# Patient Record
Sex: FEMALE | Race: WHITE | HISPANIC OR LATINO | URBAN - METROPOLITAN AREA
[De-identification: names, ages, dates, MRNs, and addresses within clinical notes are randomized per-mention and may not be internally consistent; named-entity substitution may affect disease eponyms.]

---

## 2018-09-12 ENCOUNTER — OUTPATIENT (OUTPATIENT)
Dept: OUTPATIENT SERVICES | Facility: HOSPITAL | Age: 23
LOS: 1 days | Discharge: HOME | End: 2018-09-12

## 2018-09-12 DIAGNOSIS — I10 ESSENTIAL (PRIMARY) HYPERTENSION: ICD-10-CM

## 2018-09-12 DIAGNOSIS — E55.9 VITAMIN D DEFICIENCY, UNSPECIFIED: ICD-10-CM

## 2018-09-12 DIAGNOSIS — Z13.220 ENCOUNTER FOR SCREENING FOR LIPOID DISORDERS: ICD-10-CM

## 2018-09-12 DIAGNOSIS — D64.9 ANEMIA, UNSPECIFIED: ICD-10-CM

## 2018-09-12 DIAGNOSIS — N39.0 URINARY TRACT INFECTION, SITE NOT SPECIFIED: ICD-10-CM

## 2019-07-26 ENCOUNTER — EMERGENCY (EMERGENCY)
Facility: HOSPITAL | Age: 24
LOS: 0 days | Discharge: HOME | End: 2019-07-27
Attending: EMERGENCY MEDICINE | Admitting: EMERGENCY MEDICINE
Payer: MEDICAID

## 2019-07-26 VITALS
TEMPERATURE: 98 F | DIASTOLIC BLOOD PRESSURE: 61 MMHG | OXYGEN SATURATION: 98 % | SYSTOLIC BLOOD PRESSURE: 121 MMHG | RESPIRATION RATE: 20 BRPM | HEART RATE: 91 BPM

## 2019-07-26 DIAGNOSIS — R10.31 RIGHT LOWER QUADRANT PAIN: ICD-10-CM

## 2019-07-26 DIAGNOSIS — N73.0 ACUTE PARAMETRITIS AND PELVIC CELLULITIS: ICD-10-CM

## 2019-07-26 DIAGNOSIS — R10.9 UNSPECIFIED ABDOMINAL PAIN: ICD-10-CM

## 2019-07-26 DIAGNOSIS — R11.0 NAUSEA: ICD-10-CM

## 2019-07-26 DIAGNOSIS — N89.9 NONINFLAMMATORY DISORDER OF VAGINA, UNSPECIFIED: ICD-10-CM

## 2019-07-26 LAB
ALBUMIN SERPL ELPH-MCNC: 4.4 G/DL — SIGNIFICANT CHANGE UP (ref 3.5–5.2)
ALP SERPL-CCNC: 92 U/L — SIGNIFICANT CHANGE UP (ref 30–115)
ALT FLD-CCNC: 15 U/L — SIGNIFICANT CHANGE UP (ref 0–41)
ANION GAP SERPL CALC-SCNC: 13 MMOL/L — SIGNIFICANT CHANGE UP (ref 7–14)
APPEARANCE UR: CLEAR — SIGNIFICANT CHANGE UP
AST SERPL-CCNC: 19 U/L — SIGNIFICANT CHANGE UP (ref 0–41)
BASOPHILS # BLD AUTO: 0.04 K/UL — SIGNIFICANT CHANGE UP (ref 0–0.2)
BASOPHILS NFR BLD AUTO: 0.3 % — SIGNIFICANT CHANGE UP (ref 0–1)
BILIRUB DIRECT SERPL-MCNC: <0.2 MG/DL — SIGNIFICANT CHANGE UP (ref 0–0.2)
BILIRUB INDIRECT FLD-MCNC: >0 MG/DL — LOW (ref 0.2–1.2)
BILIRUB SERPL-MCNC: 0.2 MG/DL — SIGNIFICANT CHANGE UP (ref 0.2–1.2)
BILIRUB UR-MCNC: NEGATIVE — SIGNIFICANT CHANGE UP
BLD GP AB SCN SERPL QL: SIGNIFICANT CHANGE UP
BUN SERPL-MCNC: 13 MG/DL — SIGNIFICANT CHANGE UP (ref 10–20)
CALCIUM SERPL-MCNC: 9.7 MG/DL — SIGNIFICANT CHANGE UP (ref 8.5–10.1)
CHLORIDE SERPL-SCNC: 101 MMOL/L — SIGNIFICANT CHANGE UP (ref 98–110)
CO2 SERPL-SCNC: 28 MMOL/L — SIGNIFICANT CHANGE UP (ref 17–32)
COLOR SPEC: SIGNIFICANT CHANGE UP
CREAT SERPL-MCNC: 0.6 MG/DL — LOW (ref 0.7–1.5)
DIFF PNL FLD: NEGATIVE — SIGNIFICANT CHANGE UP
EOSINOPHIL # BLD AUTO: 0.27 K/UL — SIGNIFICANT CHANGE UP (ref 0–0.7)
EOSINOPHIL NFR BLD AUTO: 2.2 % — SIGNIFICANT CHANGE UP (ref 0–8)
GLUCOSE SERPL-MCNC: 102 MG/DL — HIGH (ref 70–99)
GLUCOSE UR QL: NEGATIVE — SIGNIFICANT CHANGE UP
HCG SERPL QL: NEGATIVE — SIGNIFICANT CHANGE UP
HCT VFR BLD CALC: 37.6 % — SIGNIFICANT CHANGE UP (ref 37–47)
HGB BLD-MCNC: 12.8 G/DL — SIGNIFICANT CHANGE UP (ref 12–16)
IMM GRANULOCYTES NFR BLD AUTO: 0.2 % — SIGNIFICANT CHANGE UP (ref 0.1–0.3)
KETONES UR-MCNC: ABNORMAL
LEUKOCYTE ESTERASE UR-ACNC: NEGATIVE — SIGNIFICANT CHANGE UP
LIDOCAIN IGE QN: 18 U/L — SIGNIFICANT CHANGE UP (ref 7–60)
LYMPHOCYTES # BLD AUTO: 3.72 K/UL — HIGH (ref 1.2–3.4)
LYMPHOCYTES # BLD AUTO: 30.5 % — SIGNIFICANT CHANGE UP (ref 20.5–51.1)
MCHC RBC-ENTMCNC: 29.6 PG — SIGNIFICANT CHANGE UP (ref 27–31)
MCHC RBC-ENTMCNC: 34 G/DL — SIGNIFICANT CHANGE UP (ref 32–37)
MCV RBC AUTO: 86.8 FL — SIGNIFICANT CHANGE UP (ref 81–99)
MONOCYTES # BLD AUTO: 0.65 K/UL — HIGH (ref 0.1–0.6)
MONOCYTES NFR BLD AUTO: 5.3 % — SIGNIFICANT CHANGE UP (ref 1.7–9.3)
NEUTROPHILS # BLD AUTO: 7.49 K/UL — HIGH (ref 1.4–6.5)
NEUTROPHILS NFR BLD AUTO: 61.5 % — SIGNIFICANT CHANGE UP (ref 42.2–75.2)
NITRITE UR-MCNC: NEGATIVE — SIGNIFICANT CHANGE UP
NRBC # BLD: 0 /100 WBCS — SIGNIFICANT CHANGE UP (ref 0–0)
PH UR: 6.5 — SIGNIFICANT CHANGE UP (ref 5–8)
PLATELET # BLD AUTO: 215 K/UL — SIGNIFICANT CHANGE UP (ref 130–400)
POTASSIUM SERPL-MCNC: 3.7 MMOL/L — SIGNIFICANT CHANGE UP (ref 3.5–5)
POTASSIUM SERPL-SCNC: 3.7 MMOL/L — SIGNIFICANT CHANGE UP (ref 3.5–5)
PROT SERPL-MCNC: 8.2 G/DL — HIGH (ref 6–8)
PROT UR-MCNC: NEGATIVE — SIGNIFICANT CHANGE UP
RBC # BLD: 4.33 M/UL — SIGNIFICANT CHANGE UP (ref 4.2–5.4)
RBC # FLD: 12.3 % — SIGNIFICANT CHANGE UP (ref 11.5–14.5)
SODIUM SERPL-SCNC: 142 MMOL/L — SIGNIFICANT CHANGE UP (ref 135–146)
SP GR SPEC: 1.02 — SIGNIFICANT CHANGE UP (ref 1.01–1.02)
UROBILINOGEN FLD QL: SIGNIFICANT CHANGE UP
WBC # BLD: 12.2 K/UL — HIGH (ref 4.8–10.8)
WBC # FLD AUTO: 12.2 K/UL — HIGH (ref 4.8–10.8)

## 2019-07-26 PROCEDURE — 99285 EMERGENCY DEPT VISIT HI MDM: CPT

## 2019-07-26 PROCEDURE — 76856 US EXAM PELVIC COMPLETE: CPT | Mod: 26

## 2019-07-26 RX ORDER — IOHEXOL 300 MG/ML
30 INJECTION, SOLUTION INTRAVENOUS ONCE
Refills: 0 | Status: COMPLETED | OUTPATIENT
Start: 2019-07-26 | End: 2019-07-26

## 2019-07-26 RX ORDER — SODIUM CHLORIDE 9 MG/ML
1000 INJECTION INTRAMUSCULAR; INTRAVENOUS; SUBCUTANEOUS ONCE
Refills: 0 | Status: COMPLETED | OUTPATIENT
Start: 2019-07-26 | End: 2019-07-26

## 2019-07-26 RX ORDER — CEFTRIAXONE 500 MG/1
250 INJECTION, POWDER, FOR SOLUTION INTRAMUSCULAR; INTRAVENOUS ONCE
Refills: 0 | Status: COMPLETED | OUTPATIENT
Start: 2019-07-26 | End: 2019-07-26

## 2019-07-26 RX ORDER — AZITHROMYCIN 500 MG/1
1 TABLET, FILM COATED ORAL ONCE
Refills: 0 | Status: COMPLETED | OUTPATIENT
Start: 2019-07-26 | End: 2019-07-26

## 2019-07-26 RX ORDER — MORPHINE SULFATE 50 MG/1
4 CAPSULE, EXTENDED RELEASE ORAL ONCE
Refills: 0 | Status: DISCONTINUED | OUTPATIENT
Start: 2019-07-26 | End: 2019-07-26

## 2019-07-26 RX ADMIN — SODIUM CHLORIDE 1000 MILLILITER(S): 9 INJECTION INTRAMUSCULAR; INTRAVENOUS; SUBCUTANEOUS at 20:33

## 2019-07-26 RX ADMIN — AZITHROMYCIN 1 GRAM(S): 500 TABLET, FILM COATED ORAL at 23:56

## 2019-07-26 RX ADMIN — CEFTRIAXONE 250 MILLIGRAM(S): 500 INJECTION, POWDER, FOR SOLUTION INTRAMUSCULAR; INTRAVENOUS at 23:56

## 2019-07-26 RX ADMIN — MORPHINE SULFATE 4 MILLIGRAM(S): 50 CAPSULE, EXTENDED RELEASE ORAL at 23:08

## 2019-07-26 RX ADMIN — IOHEXOL 30 MILLILITER(S): 300 INJECTION, SOLUTION INTRAVENOUS at 22:42

## 2019-07-26 NOTE — ED ADULT NURSE NOTE - NSIMPLEMENTINTERV_GEN_ALL_ED
Implemented All Universal Safety Interventions:  [place] to call system. Call bell, personal items and telephone within reach. Instruct patient to call for assistance. Room bathroom lighting operational. Non-slip footwear when patient is off stretcher. Physically safe environment: no spills, clutter or unnecessary equipment. Stretcher in lowest position, wheels locked, appropriate side rails in place.

## 2019-07-27 PROCEDURE — 74177 CT ABD & PELVIS W/CONTRAST: CPT | Mod: 26

## 2019-07-27 NOTE — ED PROVIDER NOTE - NSFOLLOWUPCLINICS_GEN_ALL_ED_FT
[facility]  Medicine  [address], [place]   Phone: [phone]  Fax:   Follow Up Time: 1-3 Days    [facility]  OB/GYN  [address] [place] [address]  Phone: [phone]  Fax:   Follow Up Time: 1-3 Days

## 2019-07-27 NOTE — ED PROVIDER NOTE - NSFOLLOWUPINSTRUCTIONS_ED_ALL_ED_FT
Abdominal Pain    Many things can cause abdominal pain. Usually, abdominal pain is not caused by a disease and will improve without treatment. Your health care provider will do a physical exam and possibly order blood tests and imaging to help determine the seriousness of your pain. However, in many cases, no cause may be found and you may need further testing as an outpatient. Monitor your abdominal pain for any changes.     SEEK IMMEDIATE MEDICAL CARE IF YOU HAVE THE FOLLOWING SYMPTOMS: worsening abdominal pain, vomiting, diarrhea, inability to have bowel movements or pass gas, black or bloody stool, fever accompanying chest pain or back pain, or dizziness/lightheadedness.    Pelvic Inflammatory Disease    Pelvic inflammatory disease (PID) refers to an infection in some or all of the female organs. The infection can be in the uterus, ovaries, fallopian tubes, or the surrounding tissues in the pelvis. PID can cause abdominal or pelvic pain that comes on suddenly (acute pelvic pain). PID is a serious infection because it can lead to lasting (chronic) pelvic pain or the inability to have children (infertility).    What are the causes?  This condition is most often caused by an infection that is spread during sexual contact. However, the infection can also be caused by the normal bacteria that are found in the vaginal tissues if these bacteria travel upward into the reproductive organs. PID can also occur following:  The birth of a baby.  A miscarriage.  An .  Major pelvic surgery.  The use of an intrauterine device (IUD).  A sexual assault.  What increases the risk?  This condition is more likely to develop in women who:  Are younger than 25 years of age.  Are sexually active at a young age.  Use nonbarrier contraception.  Have multiple sexual partners.  Have sex with someone who has symptoms of an STD (sexually transmitted disease).  Use oral contraception.  At times, certain behaviors can also increase the possibility of getting PID, such as:  Using a vaginal douche.  Having an IUD in place.  What are the signs or symptoms?  Symptoms of this condition include:  Abdominal or pelvic pain.  Fever.  Chills.  Abnormal vaginal discharge.  Abnormal uterine bleeding.  Unusual pain shortly after the end of a menstrual period.  Painful urination.  Pain with sexual intercourse.  Nausea and vomiting.  How is this diagnosed?  To diagnose this condition, your health care provider will do a physical exam and take your medical history. A pelvic exam typically reveals great tenderness in the uterus and the surrounding pelvic tissues. You may also have tests, such as:  Lab tests, including a pregnancy test, blood tests, and urine test.  Culture tests of the vagina and cervix to check for an STD.  Ultrasound.  A laparoscopic procedure to look inside the pelvis.  Examining vaginal secretions under a microscope.  How is this treated?  Treatment for this condition may involve one or more approaches.  Antibiotic medicines may be prescribed to be taken by mouth.  Sexual partners may need to be treated if the infection is caused by an STD.  For more severe cases, hospitalization may be needed to give antibiotics directly into a vein through an IV tube.  Surgery may be needed if other treatments do not help, but this is rare.  It may take weeks until you are completely well. If you are diagnosed with PID, you should also be checked for human immunodeficiency virus (HIV). Your health care provider may test you for infection again 3 months after treatment. You should not have unprotected sex.    Follow these instructions at home:  Take over-the-counter and prescription medicines only as told by your health care provider.  If you were prescribed an antibiotic medicine, take it as told by your health care provider. Do not stop taking the antibiotic even if you start to feel better.  Do not have sexual intercourse until treatment is completed or as told by your health care provider. If PID is confirmed, your recent sexual partners will need treatment, especially if you had unprotected sex.  Keep all follow-up visits as told by your health care provider. This is important.  Contact a health care provider if:  You have increased or abnormal vaginal discharge.  Your pain does not improve.  You vomit.  You have a fever.  You cannot tolerate your medicines.  Your partner has an STD.  You have pain when you urinate.  Get help right away if:  You have increased abdominal or pelvic pain.  You have chills.  Your symptoms are not better in 72 hours even with treatment.  This information is not intended to replace advice given to you by your health care provider. Make sure you discuss any questions you have with your health care provider.

## 2019-07-27 NOTE — ED PROVIDER NOTE - CARE PLAN
Principal Discharge DX:	Right lower quadrant abdominal pain  Secondary Diagnosis:	PID (acute pelvic inflammatory disease)

## 2019-07-27 NOTE — ED PROVIDER NOTE - OBJECTIVE STATEMENT
Patient is c/o RLQ abdominal pain x 2 days, associated with nausea, +vaginal discharge, light yellow color, is sexually active, denies f/c/rash, denies urinary symptoms, denies trauma, no travel, no sick contacts. Abdominal pain is localized to RLQ area, pain is better at rest, worse with standing/walking, worse with coughing, no diarrhea. Patient is c/o RLQ abdominal pain x 2 days, associated with nausea, +vaginal discharge, light yellow color, is sexually active, denies f/c/rash, denies urinary symptoms, denies trauma, no travel, no sick contacts. Abdominal pain is localized to RLQ area, pain is better at rest, worse with standing/walking, worse with coughing, no diarrhea, no relieving factors.

## 2019-07-27 NOTE — ED PROVIDER NOTE - PHYSICAL EXAMINATION
Patient permission obtained for pelvic exam, Chaperon is ED PCA.   Pelvic exam: External genitalia appears WNL, no rash, no lesions noted, +thick yellowish discharge noted, +CMT, +Rt adnexal tenderness, no mass.

## 2019-07-29 LAB
C TRACH RRNA SPEC QL NAA+PROBE: SIGNIFICANT CHANGE UP
N GONORRHOEA RRNA SPEC QL NAA+PROBE: SIGNIFICANT CHANGE UP
SPECIMEN SOURCE: SIGNIFICANT CHANGE UP

## 2022-06-03 PROBLEM — Z78.9 OTHER SPECIFIED HEALTH STATUS: Chronic | Status: ACTIVE | Noted: 2019-07-26

## 2022-06-13 PROBLEM — Z00.00 ENCOUNTER FOR PREVENTIVE HEALTH EXAMINATION: Status: ACTIVE | Noted: 2022-06-13

## 2022-06-17 ENCOUNTER — RESULT CHARGE (OUTPATIENT)
Age: 27
End: 2022-06-17

## 2022-06-17 ENCOUNTER — APPOINTMENT (OUTPATIENT)
Dept: OBGYN | Facility: CLINIC | Age: 27
End: 2022-06-17
Payer: COMMERCIAL

## 2022-06-17 ENCOUNTER — APPOINTMENT (OUTPATIENT)
Dept: OBGYN | Facility: CLINIC | Age: 27
End: 2022-06-17

## 2022-06-17 ENCOUNTER — OUTPATIENT (OUTPATIENT)
Dept: OUTPATIENT SERVICES | Facility: HOSPITAL | Age: 27
LOS: 1 days | Discharge: HOME | End: 2022-06-17

## 2022-06-17 VITALS
HEIGHT: 59 IN | SYSTOLIC BLOOD PRESSURE: 110 MMHG | BODY MASS INDEX: 28.44 KG/M2 | DIASTOLIC BLOOD PRESSURE: 72 MMHG | WEIGHT: 141.06 LBS

## 2022-06-17 DIAGNOSIS — Z78.9 OTHER SPECIFIED HEALTH STATUS: ICD-10-CM

## 2022-06-17 DIAGNOSIS — N91.1 SECONDARY AMENORRHEA: ICD-10-CM

## 2022-06-17 DIAGNOSIS — Z83.3 FAMILY HISTORY OF DIABETES MELLITUS: ICD-10-CM

## 2022-06-17 DIAGNOSIS — R73.03 PREDIABETES.: ICD-10-CM

## 2022-06-17 DIAGNOSIS — Z82.79 FAMILY HISTORY OF OTHER CONGENITAL MALFORMATIONS, DEFORMATIONS AND CHROMOSOMAL ABNORMALITIES: ICD-10-CM

## 2022-06-17 PROCEDURE — 76815 OB US LIMITED FETUS(S): CPT | Mod: 26

## 2022-06-17 PROCEDURE — 99203 OFFICE O/P NEW LOW 30 MIN: CPT | Mod: 25

## 2022-06-22 PROBLEM — N91.1 SECONDARY AMENORRHEA: Status: ACTIVE | Noted: 2022-06-22

## 2022-06-22 PROBLEM — Z82.79 FAMILY HISTORY OF DOWN SYNDROME: Status: ACTIVE | Noted: 2022-06-22

## 2022-06-22 PROBLEM — Z78.9 NON-SMOKER: Status: ACTIVE | Noted: 2022-06-22

## 2022-06-22 PROBLEM — R73.03 PREDIABETES: Status: ACTIVE | Noted: 2022-06-22

## 2022-06-22 PROBLEM — Z83.3 FAMILY HISTORY OF DIABETES MELLITUS: Status: ACTIVE | Noted: 2022-06-22

## 2022-06-22 LAB
HCG UR QL: POSITIVE
QUALITY CONTROL: YES

## 2022-07-01 ENCOUNTER — RESULT CHARGE (OUTPATIENT)
Age: 27
End: 2022-07-01

## 2022-07-01 ENCOUNTER — APPOINTMENT (OUTPATIENT)
Dept: OBGYN | Facility: CLINIC | Age: 27
End: 2022-07-01

## 2022-07-01 ENCOUNTER — OUTPATIENT (OUTPATIENT)
Dept: OUTPATIENT SERVICES | Facility: HOSPITAL | Age: 27
LOS: 1 days | Discharge: HOME | End: 2022-07-01

## 2022-07-01 VITALS
HEIGHT: 59 IN | WEIGHT: 137 LBS | SYSTOLIC BLOOD PRESSURE: 120 MMHG | BODY MASS INDEX: 27.62 KG/M2 | DIASTOLIC BLOOD PRESSURE: 70 MMHG

## 2022-07-01 DIAGNOSIS — Z34.81 ENCOUNTER FOR SUPERVISION OF OTHER NORMAL PREGNANCY, FIRST TRIMESTER: ICD-10-CM

## 2022-07-01 PROCEDURE — 99214 OFFICE O/P EST MOD 30 MIN: CPT | Mod: 25

## 2022-07-01 PROCEDURE — 76815 OB US LIMITED FETUS(S): CPT | Mod: 26

## 2022-07-06 LAB
BILIRUB UR QL STRIP: NORMAL
C TRACH RRNA SPEC QL NAA+PROBE: NOT DETECTED
CLARITY UR: CLEAR
COLLECTION METHOD: NORMAL
GLUCOSE UR-MCNC: NORMAL
HGB UR QL STRIP.AUTO: NORMAL
KETONES UR-MCNC: NORMAL
LEUKOCYTE ESTERASE UR QL STRIP: NORMAL
N GONORRHOEA RRNA SPEC QL NAA+PROBE: NOT DETECTED
NITRITE UR QL STRIP: NORMAL
PROT UR STRIP-MCNC: NORMAL
SOURCE AMPLIFICATION: NORMAL

## 2022-07-10 LAB — CYTOLOGY CVX/VAG DOC THIN PREP: NORMAL

## 2022-07-22 ENCOUNTER — NON-APPOINTMENT (OUTPATIENT)
Age: 27
End: 2022-07-22

## 2022-07-28 ENCOUNTER — NON-APPOINTMENT (OUTPATIENT)
Age: 27
End: 2022-07-28

## 2022-07-28 LAB
ABO + RH PNL BLD: NORMAL
BASOPHILS # BLD AUTO: 0.04 K/UL
BASOPHILS NFR BLD AUTO: 0.4 %
BLD GP AB SCN SERPL QL: NORMAL
EOSINOPHIL # BLD AUTO: 0.23 K/UL
EOSINOPHIL NFR BLD AUTO: 2.4 %
GLUCOSE 1H P 50 G GLC PO SERPL-MCNC: 113 MG/DL
HBV SURFACE AG SERPL QL IA: NONREACTIVE
HCT VFR BLD CALC: 36.7 %
HGB BLD-MCNC: 12 G/DL
HIV1+2 AB SPEC QL IA.RAPID: NONREACTIVE
IMM GRANULOCYTES NFR BLD AUTO: 0.5 %
LEAD BLD-MCNC: <1 UG/DL
LYMPHOCYTES # BLD AUTO: 2 K/UL
LYMPHOCYTES NFR BLD AUTO: 20.7 %
MAN DIFF?: NORMAL
MCHC RBC-ENTMCNC: 29 PG
MCHC RBC-ENTMCNC: 32.7 G/DL
MCV RBC AUTO: 88.6 FL
MONOCYTES # BLD AUTO: 0.43 K/UL
MONOCYTES NFR BLD AUTO: 4.4 %
NEUTROPHILS # BLD AUTO: 6.92 K/UL
NEUTROPHILS NFR BLD AUTO: 71.6 %
PLATELET # BLD AUTO: 214 K/UL
RBC # BLD: 4.14 M/UL
RBC # FLD: 12.8 %
WBC # FLD AUTO: 9.67 K/UL

## 2022-07-29 ENCOUNTER — OUTPATIENT (OUTPATIENT)
Dept: OUTPATIENT SERVICES | Facility: HOSPITAL | Age: 27
LOS: 1 days | Discharge: HOME | End: 2022-07-29

## 2022-07-29 ENCOUNTER — RESULT CHARGE (OUTPATIENT)
Age: 27
End: 2022-07-29

## 2022-07-29 ENCOUNTER — APPOINTMENT (OUTPATIENT)
Dept: OBGYN | Facility: CLINIC | Age: 27
End: 2022-07-29

## 2022-07-29 VITALS
SYSTOLIC BLOOD PRESSURE: 124 MMHG | WEIGHT: 131.04 LBS | HEIGHT: 59 IN | BODY MASS INDEX: 26.42 KG/M2 | DIASTOLIC BLOOD PRESSURE: 68 MMHG

## 2022-07-29 DIAGNOSIS — Z34.81 ENCOUNTER FOR SUPERVISION OF OTHER NORMAL PREGNANCY, FIRST TRIMESTER: ICD-10-CM

## 2022-07-29 LAB
BACTERIA UR CULT: NORMAL
BILIRUB UR QL STRIP: NORMAL
CLARITY UR: CLEAR
COLLECTION METHOD: NORMAL
GLUCOSE UR-MCNC: NORMAL
HCG UR QL: 0.2 EU/DL
HGB A MFR BLD: 97.5 %
HGB A2 MFR BLD: 2.5 %
HGB FRACT BLD-IMP: NORMAL
HGB UR QL STRIP.AUTO: NORMAL
KETONES UR-MCNC: NORMAL
LEUKOCYTE ESTERASE UR QL STRIP: NORMAL
NITRITE UR QL STRIP: NORMAL
PH UR STRIP: 6.5
PROT UR STRIP-MCNC: NORMAL
SP GR UR STRIP: 1.03

## 2022-07-29 PROCEDURE — 99213 OFFICE O/P EST LOW 20 MIN: CPT

## 2022-08-04 ENCOUNTER — ASOB RESULT (OUTPATIENT)
Age: 27
End: 2022-08-04

## 2022-08-04 ENCOUNTER — APPOINTMENT (OUTPATIENT)
Dept: ANTEPARTUM | Facility: CLINIC | Age: 27
End: 2022-08-04

## 2022-08-04 ENCOUNTER — OUTPATIENT (OUTPATIENT)
Dept: OUTPATIENT SERVICES | Facility: HOSPITAL | Age: 27
LOS: 1 days | Discharge: HOME | End: 2022-08-04

## 2022-08-04 PROCEDURE — 76813 OB US NUCHAL MEAS 1 GEST: CPT | Mod: 26

## 2022-08-10 LAB
CLARI TEST COMMENT: NORMAL
CLARIM 22Q11.2: NORMAL
CLARIM ADDITIONAL INFO: NORMAL
CLARIM CHROMOSOME 13: NORMAL
CLARIM CHROMOSOME 18: NORMAL
CLARIM CHROMOSOME 21: NORMAL
CLARIM EDD: NORMAL
CLARIM SEX CHROMOSOMES: NORMAL
CLARITEST NIPT W/MICRO: NORMAL
FETAL FRACT: NORMAL
FMR1 GENE MUT ANL BLD/T: NORMAL
GESTATION AGE: NORMAL
MATERNAL WEIGHT (LBS):: 131
PLEASE INCLUDE GENDER RESULTS ON THIS REPORT:: NORMAL
TYPE OF PREGNANCY:: NORMAL

## 2022-08-25 ENCOUNTER — NON-APPOINTMENT (OUTPATIENT)
Age: 27
End: 2022-08-25

## 2022-08-26 ENCOUNTER — OUTPATIENT (OUTPATIENT)
Dept: OUTPATIENT SERVICES | Facility: HOSPITAL | Age: 27
LOS: 1 days | Discharge: HOME | End: 2022-08-26

## 2022-08-26 ENCOUNTER — APPOINTMENT (OUTPATIENT)
Dept: OBGYN | Facility: CLINIC | Age: 27
End: 2022-08-26

## 2022-08-26 ENCOUNTER — RESULT CHARGE (OUTPATIENT)
Age: 27
End: 2022-08-26

## 2022-08-26 VITALS — SYSTOLIC BLOOD PRESSURE: 105 MMHG | BODY MASS INDEX: 26.66 KG/M2 | WEIGHT: 132 LBS | DIASTOLIC BLOOD PRESSURE: 60 MMHG

## 2022-08-26 DIAGNOSIS — Z34.92 ENCOUNTER FOR SUPERVISION OF NORMAL PREGNANCY, UNSPECIFIED, SECOND TRIMESTER: ICD-10-CM

## 2022-08-26 PROCEDURE — 99213 OFFICE O/P EST LOW 20 MIN: CPT

## 2022-08-26 RX ORDER — ONDANSETRON 4 MG/1
4 TABLET, ORALLY DISINTEGRATING ORAL
Qty: 30 | Refills: 1 | Status: ACTIVE | COMMUNITY
Start: 2022-08-26 | End: 1900-01-01

## 2022-08-27 LAB
BILIRUB UR QL STRIP: NORMAL
CLARITY UR: CLEAR
COLLECTION METHOD: NORMAL
GLUCOSE UR-MCNC: POSITIVE
HCG UR QL: 0.2 EU/DL
HGB UR QL STRIP.AUTO: NORMAL
KETONES UR-MCNC: NORMAL
LEUKOCYTE ESTERASE UR QL STRIP: NORMAL
NITRITE UR QL STRIP: NORMAL
PH UR STRIP: 6
PROT UR STRIP-MCNC: NORMAL
SP GR UR STRIP: 1.01

## 2022-09-15 LAB — CFTR MUT TESTED BLD/T: NEGATIVE

## 2022-09-23 ENCOUNTER — ASOB RESULT (OUTPATIENT)
Age: 27
End: 2022-09-23

## 2022-09-23 ENCOUNTER — APPOINTMENT (OUTPATIENT)
Dept: OBGYN | Facility: CLINIC | Age: 27
End: 2022-09-23

## 2022-09-23 ENCOUNTER — OUTPATIENT (OUTPATIENT)
Dept: OUTPATIENT SERVICES | Facility: HOSPITAL | Age: 27
LOS: 1 days | Discharge: HOME | End: 2022-09-23

## 2022-09-23 ENCOUNTER — APPOINTMENT (OUTPATIENT)
Dept: ANTEPARTUM | Facility: CLINIC | Age: 27
End: 2022-09-23

## 2022-09-23 ENCOUNTER — RESULT CHARGE (OUTPATIENT)
Age: 27
End: 2022-09-23

## 2022-09-23 VITALS
HEIGHT: 59 IN | WEIGHT: 137 LBS | DIASTOLIC BLOOD PRESSURE: 64 MMHG | SYSTOLIC BLOOD PRESSURE: 102 MMHG | BODY MASS INDEX: 27.62 KG/M2

## 2022-09-23 PROCEDURE — 99213 OFFICE O/P EST LOW 20 MIN: CPT

## 2022-09-23 PROCEDURE — 76817 TRANSVAGINAL US OBSTETRIC: CPT | Mod: 26

## 2022-09-23 PROCEDURE — 76805 OB US >/= 14 WKS SNGL FETUS: CPT | Mod: 26

## 2022-10-04 LAB
AFP PNL SERPL: NORMAL
BILIRUB UR QL STRIP: NORMAL
CLARITY UR: CLEAR
COLLECTION METHOD: NORMAL
GLUCOSE UR-MCNC: NORMAL
HCG UR QL: 0.2 EU/DL
HGB UR QL STRIP.AUTO: NORMAL
KETONES UR-MCNC: NORMAL
LEUKOCYTE ESTERASE UR QL STRIP: NORMAL
NITRITE UR QL STRIP: NORMAL
PH UR STRIP: 7
PROT UR STRIP-MCNC: NORMAL
SP GR UR STRIP: 1.01

## 2022-10-11 ENCOUNTER — NON-APPOINTMENT (OUTPATIENT)
Age: 27
End: 2022-10-11

## 2022-10-20 ENCOUNTER — NON-APPOINTMENT (OUTPATIENT)
Age: 27
End: 2022-10-20

## 2022-10-21 ENCOUNTER — APPOINTMENT (OUTPATIENT)
Dept: OBGYN | Facility: CLINIC | Age: 27
End: 2022-10-21

## 2022-10-21 ENCOUNTER — OUTPATIENT (OUTPATIENT)
Dept: OUTPATIENT SERVICES | Facility: HOSPITAL | Age: 27
LOS: 1 days | Discharge: HOME | End: 2022-10-21

## 2022-10-21 ENCOUNTER — RESULT CHARGE (OUTPATIENT)
Age: 27
End: 2022-10-21

## 2022-10-21 VITALS
HEIGHT: 71 IN | SYSTOLIC BLOOD PRESSURE: 110 MMHG | WEIGHT: 142 LBS | DIASTOLIC BLOOD PRESSURE: 60 MMHG | BODY MASS INDEX: 19.88 KG/M2

## 2022-10-21 DIAGNOSIS — Z34.92 ENCOUNTER FOR SUPERVISION OF NORMAL PREGNANCY, UNSPECIFIED, SECOND TRIMESTER: ICD-10-CM

## 2022-10-21 PROCEDURE — 99213 OFFICE O/P EST LOW 20 MIN: CPT

## 2022-10-23 LAB
BILIRUB UR QL STRIP: NORMAL
CLARITY UR: CLEAR
COLLECTION METHOD: NORMAL
GLUCOSE UR-MCNC: NORMAL
HCG UR QL: 0.2 EU/DL
HGB UR QL STRIP.AUTO: NORMAL
KETONES UR-MCNC: NORMAL
LEUKOCYTE ESTERASE UR QL STRIP: NORMAL
NITRITE UR QL STRIP: NORMAL
PH UR STRIP: 6
PROT UR STRIP-MCNC: NORMAL
SP GR UR STRIP: 1.02

## 2022-10-28 ENCOUNTER — NON-APPOINTMENT (OUTPATIENT)
Age: 27
End: 2022-10-28

## 2022-11-14 ENCOUNTER — NON-APPOINTMENT (OUTPATIENT)
Age: 27
End: 2022-11-14

## 2022-11-17 ENCOUNTER — NON-APPOINTMENT (OUTPATIENT)
Age: 27
End: 2022-11-17

## 2022-11-25 ENCOUNTER — APPOINTMENT (OUTPATIENT)
Dept: OBGYN | Facility: CLINIC | Age: 27
End: 2022-11-25

## 2022-11-25 ENCOUNTER — OUTPATIENT (OUTPATIENT)
Dept: OUTPATIENT SERVICES | Facility: HOSPITAL | Age: 27
LOS: 1 days | Discharge: HOME | End: 2022-11-25

## 2022-11-25 VITALS
BODY MASS INDEX: 20.3 KG/M2 | HEIGHT: 71 IN | DIASTOLIC BLOOD PRESSURE: 62 MMHG | SYSTOLIC BLOOD PRESSURE: 110 MMHG | WEIGHT: 145 LBS

## 2022-11-25 DIAGNOSIS — Z34.92 ENCOUNTER FOR SUPERVISION OF NORMAL PREGNANCY, UNSPECIFIED, SECOND TRIMESTER: ICD-10-CM

## 2022-11-25 DIAGNOSIS — Z23 ENCOUNTER FOR IMMUNIZATION: ICD-10-CM

## 2022-11-25 LAB
BASOPHILS # BLD AUTO: 0.04 K/UL
BASOPHILS NFR BLD AUTO: 0.4 %
EOSINOPHIL # BLD AUTO: 0.17 K/UL
EOSINOPHIL NFR BLD AUTO: 1.9 %
GLUCOSE 1H P 50 G GLC PO SERPL-MCNC: 164 MG/DL
HCT VFR BLD CALC: 34.1 %
HGB BLD-MCNC: 11.5 G/DL
IMM GRANULOCYTES NFR BLD AUTO: 1 %
LYMPHOCYTES # BLD AUTO: 1.9 K/UL
LYMPHOCYTES NFR BLD AUTO: 20.7 %
MAN DIFF?: NORMAL
MCHC RBC-ENTMCNC: 30.3 PG
MCHC RBC-ENTMCNC: 33.7 G/DL
MCV RBC AUTO: 90 FL
MONOCYTES # BLD AUTO: 0.39 K/UL
MONOCYTES NFR BLD AUTO: 4.3 %
NEUTROPHILS # BLD AUTO: 6.57 K/UL
NEUTROPHILS NFR BLD AUTO: 71.7 %
PLATELET # BLD AUTO: 183 K/UL
RBC # BLD: 3.79 M/UL
RBC # FLD: 13.6 %
WBC # FLD AUTO: 9.16 K/UL

## 2022-11-25 PROCEDURE — 99213 OFFICE O/P EST LOW 20 MIN: CPT

## 2022-12-09 ENCOUNTER — RESULT CHARGE (OUTPATIENT)
Age: 27
End: 2022-12-09

## 2022-12-09 ENCOUNTER — APPOINTMENT (OUTPATIENT)
Dept: OBGYN | Facility: CLINIC | Age: 27
End: 2022-12-09

## 2022-12-09 ENCOUNTER — OUTPATIENT (OUTPATIENT)
Dept: OUTPATIENT SERVICES | Facility: HOSPITAL | Age: 27
LOS: 1 days | Discharge: HOME | End: 2022-12-09

## 2022-12-09 VITALS — BODY MASS INDEX: 28.68 KG/M2 | WEIGHT: 142 LBS | DIASTOLIC BLOOD PRESSURE: 71 MMHG | SYSTOLIC BLOOD PRESSURE: 102 MMHG

## 2022-12-09 PROCEDURE — 99213 OFFICE O/P EST LOW 20 MIN: CPT

## 2022-12-13 LAB
BILIRUB UR QL STRIP: NORMAL
CLARITY UR: CLEAR
COLLECTION METHOD: NORMAL
GLUCOSE UR-MCNC: NORMAL
HCG UR QL: 0.2 EU/DL
HGB UR QL STRIP.AUTO: NORMAL
KETONES UR-MCNC: NORMAL
LEUKOCYTE ESTERASE UR QL STRIP: NORMAL
NITRITE UR QL STRIP: NORMAL
PH UR STRIP: 6.5
PROT UR STRIP-MCNC: NORMAL
SP GR UR STRIP: 1.01

## 2022-12-15 ENCOUNTER — NON-APPOINTMENT (OUTPATIENT)
Age: 27
End: 2022-12-15

## 2022-12-15 DIAGNOSIS — Z34.83 ENCOUNTER FOR SUPERVISION OF OTHER NORMAL PREGNANCY, THIRD TRIMESTER: ICD-10-CM

## 2022-12-15 DIAGNOSIS — Z34.93 ENCOUNTER FOR SUPERVISION OF NORMAL PREGNANCY, UNSPECIFIED, THIRD TRIMESTER: ICD-10-CM

## 2022-12-16 ENCOUNTER — APPOINTMENT (OUTPATIENT)
Dept: OBGYN | Facility: CLINIC | Age: 27
End: 2022-12-16

## 2022-12-16 ENCOUNTER — NON-APPOINTMENT (OUTPATIENT)
Age: 27
End: 2022-12-16

## 2022-12-16 ENCOUNTER — APPOINTMENT (OUTPATIENT)
Dept: ANTEPARTUM | Facility: CLINIC | Age: 27
End: 2022-12-16

## 2022-12-16 ENCOUNTER — OUTPATIENT (OUTPATIENT)
Dept: OUTPATIENT SERVICES | Facility: HOSPITAL | Age: 27
LOS: 1 days | Discharge: HOME | End: 2022-12-16

## 2022-12-16 ENCOUNTER — RESULT CHARGE (OUTPATIENT)
Age: 27
End: 2022-12-16

## 2022-12-16 ENCOUNTER — ASOB RESULT (OUTPATIENT)
Age: 27
End: 2022-12-16

## 2022-12-16 VITALS — WEIGHT: 140 LBS | BODY MASS INDEX: 28.28 KG/M2 | SYSTOLIC BLOOD PRESSURE: 104 MMHG | DIASTOLIC BLOOD PRESSURE: 62 MMHG

## 2022-12-16 DIAGNOSIS — Z34.93 ENCOUNTER FOR SUPERVISION OF NORMAL PREGNANCY, UNSPECIFIED, THIRD TRIMESTER: ICD-10-CM

## 2022-12-16 PROCEDURE — 99213 OFFICE O/P EST LOW 20 MIN: CPT

## 2022-12-16 PROCEDURE — 76816 OB US FOLLOW-UP PER FETUS: CPT | Mod: 26

## 2022-12-16 RX ORDER — BLOOD-GLUCOSE METER
W/DEVICE EACH MISCELLANEOUS
Qty: 1 | Refills: 0 | Status: ACTIVE | COMMUNITY
Start: 2022-12-16 | End: 1900-01-01

## 2022-12-16 RX ORDER — LANCETS 33 GAUGE
EACH MISCELLANEOUS
Qty: 120 | Refills: 4 | Status: ACTIVE | COMMUNITY
Start: 2022-12-16 | End: 1900-01-01

## 2022-12-16 RX ORDER — BLOOD SUGAR DIAGNOSTIC
STRIP MISCELLANEOUS
Qty: 120 | Refills: 2 | Status: ACTIVE | COMMUNITY
Start: 2022-12-16 | End: 1900-01-01

## 2022-12-16 RX ORDER — ISOPROPYL ALCOHOL 70 ML/100ML
SWAB TOPICAL
Qty: 200 | Refills: 3 | Status: ACTIVE | COMMUNITY
Start: 2022-12-16 | End: 1900-01-01

## 2022-12-18 LAB
BASOPHILS # BLD AUTO: 0.03 K/UL
BASOPHILS NFR BLD AUTO: 0.4 %
BILIRUB UR QL STRIP: NORMAL
CLARITY UR: CLEAR
COLLECTION METHOD: NORMAL
EOSINOPHIL # BLD AUTO: 0.1 K/UL
EOSINOPHIL NFR BLD AUTO: 1.3 %
GLUCOSE UR-MCNC: NORMAL
HCG UR QL: 0.2 EU/DL
HCT VFR BLD CALC: 34.9 %
HGB BLD-MCNC: 11.8 G/DL
HGB UR QL STRIP.AUTO: NORMAL
HIV1+2 AB SPEC QL IA.RAPID: NONREACTIVE
IMM GRANULOCYTES NFR BLD AUTO: 0.3 %
KETONES UR-MCNC: NORMAL
LEUKOCYTE ESTERASE UR QL STRIP: NORMAL
LYMPHOCYTES # BLD AUTO: 2.28 K/UL
LYMPHOCYTES NFR BLD AUTO: 29.3 %
MAN DIFF?: NORMAL
MCHC RBC-ENTMCNC: 29.9 PG
MCHC RBC-ENTMCNC: 33.8 G/DL
MCV RBC AUTO: 88.6 FL
MONOCYTES # BLD AUTO: 0.54 K/UL
MONOCYTES NFR BLD AUTO: 6.9 %
NEUTROPHILS # BLD AUTO: 4.81 K/UL
NEUTROPHILS NFR BLD AUTO: 61.8 %
NITRITE UR QL STRIP: NORMAL
PH UR STRIP: 6
PLATELET # BLD AUTO: 139 K/UL
PROT UR STRIP-MCNC: NORMAL
RBC # BLD: 3.94 M/UL
RBC # FLD: 13.5 %
SP GR UR STRIP: 1.01
T PALLIDUM AB SER QL IA: NEGATIVE
WBC # FLD AUTO: 7.78 K/UL

## 2022-12-19 ENCOUNTER — NON-APPOINTMENT (OUTPATIENT)
Age: 27
End: 2022-12-19

## 2022-12-20 ENCOUNTER — OUTPATIENT (OUTPATIENT)
Dept: OUTPATIENT SERVICES | Facility: HOSPITAL | Age: 27
LOS: 1 days | Discharge: HOME | End: 2022-12-20

## 2022-12-20 ENCOUNTER — APPOINTMENT (OUTPATIENT)
Dept: OBGYN | Facility: CLINIC | Age: 27
End: 2022-12-20

## 2022-12-20 ENCOUNTER — RESULT CHARGE (OUTPATIENT)
Age: 27
End: 2022-12-20

## 2022-12-20 ENCOUNTER — APPOINTMENT (OUTPATIENT)
Dept: ANTEPARTUM | Facility: CLINIC | Age: 27
End: 2022-12-20

## 2022-12-20 VITALS
DIASTOLIC BLOOD PRESSURE: 60 MMHG | HEART RATE: 75 BPM | SYSTOLIC BLOOD PRESSURE: 125 MMHG | OXYGEN SATURATION: 100 % | WEIGHT: 140 LBS | TEMPERATURE: 98.4 F | BODY MASS INDEX: 28.28 KG/M2

## 2022-12-20 LAB — GLUCOSE BLDC GLUCOMTR-MCNC: 106 MG/DL — HIGH (ref 70–99)

## 2022-12-20 PROCEDURE — 99203 OFFICE O/P NEW LOW 30 MIN: CPT

## 2022-12-20 NOTE — END OF VISIT
[] : Resident [FreeTextEntry3] : Well controlled GDM A1.\par Counseled about benefits of GDM control, and risks of uncontrolled GDM in pregnancy.\par Follow-up in 2 weeks.

## 2022-12-20 NOTE — DISCUSSION/SUMMARY
[FreeTextEntry1] : 26 y/o  at 33w1d. TERRELL 2023, dated by LMP (22) c/w sonogram at 13w3d, presents for initial MFM consult for GDM. Co-managed with Dr. Fletcher. Reports good fetal movement. Denies any LOF, vaginal bleeding or contractions. \par \par 22 early GCT: 113\par 22: \par GTT 82/180(H)/182(H)/92\par \par Physical exam:\par GA: AAOx3 in NAD.\par CV: normal s1s2.\par Pulm: CTAB.\par abd: gravid, nontender.\par FHR: 133 bpm\par \par Pmhx: Pre-diabetes, hgb A1C in 2018: 6.0%\par Allergies: Seafood- anaphylaxis\par Medications: PNV\par Surgeries: none\par Family hx: father with DM (passed away from it)\par Social hx: Lives with mother and partner, currently is not working (used to work at [employer], recently stopped working d/t long hours)\par \par Labs: HIV neg, CF neg, Hgb electrophoresis wnl, O pos,negative antibody screen, hepBsAg neg, RPR neg\par \par OBUS: \par : normal anatomy\par : EFW 1814g (16%), AC 20%, MVP 6.11cm, anterior placenta,vertex\par \par A/P 26 y/o  at 33w1d. TERRELL 2023, dated by LMP (22) c/w sonogram at 13w3d, presents for initial MFM consult for GDM. Co-managed with Dr. Fletcher.\par \par #GDM\par FS log reviewed (-)\par Fastin-85 (0/4 elevated values)\par 2Hr PP:  (0/7 elevated values)\par FS in office: 106 (3Hr PP)\par \par Counseling:  \par 1. Had appointment with nutritionist today. \par 2. She was counseled by a nurse and instructed in capillary blood glucose testing (fasting and 2 hours after each meal). \par 3. The significance and usual management of diabetes in pregnancy were reviewed, including risks of preeclampsia, Intra-Uterine Fetal Demise, macrosomia, birth trauma, pre-term labor,  section, NICU admission (the main reasons include  hypoglycemia and  jaundice) and the possible need for insulin therapy. \par 4. Exercise was encouraged. Ideally, she should walk briskly after each meal. \par \par Recommendations: \par 1. Glycemic Control. Target glucose ranges to minimize excessive fetal growth are: Fasting 60-90 mg/dl; and 2-hour postprandial < 120 mg/dl. If these values are elevated, insulin therapy is encouraged. Patient encouraged to maintain fingerstick log. \par 2. Recommend monthly growth sonogram.\par 3. Timing of Delivery. To be determined \par 4. Route of delivery. Plan for  \par 5. Glucose control in labor. During labor, capillary glucose values should be checked every few hours (depending on their stability). Insulin may be required to cover elevated glucose levels. \par 6. Long-term diabetes surveillance. The risk of developing diabetes outside of pregnancy over the next five years may be as high as 50%. I recommend that she have a 2 hour GTT or similar diabetes screen at 6 to 12 weeks postpartum with her primary Obstetrician and counseling about ways to minimize her risk. If her fasting glucose is normal, annual glucose screening by her primary care physician is advised. \par \par #Pregnancy\par - Claritest: Low risk.\par - Start ASA 81mg.\par - continue taking PNV.\par - Continue routine prenatal care with Dr. Fletcher.\par \par RTC in 2w

## 2022-12-22 DIAGNOSIS — O24.410 GESTATIONAL DIABETES MELLITUS IN PREGNANCY, DIET CONTROLLED: ICD-10-CM

## 2022-12-22 DIAGNOSIS — Z3A.33 33 WEEKS GESTATION OF PREGNANCY: ICD-10-CM

## 2022-12-23 ENCOUNTER — RESULT CHARGE (OUTPATIENT)
Age: 27
End: 2022-12-23

## 2022-12-23 ENCOUNTER — APPOINTMENT (OUTPATIENT)
Dept: OBGYN | Facility: CLINIC | Age: 27
End: 2022-12-23
Payer: COMMERCIAL

## 2022-12-23 ENCOUNTER — APPOINTMENT (OUTPATIENT)
Dept: ANTEPARTUM | Facility: CLINIC | Age: 27
End: 2022-12-23
Payer: COMMERCIAL

## 2022-12-23 ENCOUNTER — OUTPATIENT (OUTPATIENT)
Dept: OUTPATIENT SERVICES | Facility: HOSPITAL | Age: 27
LOS: 1 days | Discharge: HOME | End: 2022-12-23

## 2022-12-23 VITALS
WEIGHT: 141 LBS | BODY MASS INDEX: 28.43 KG/M2 | HEIGHT: 59 IN | SYSTOLIC BLOOD PRESSURE: 114 MMHG | DIASTOLIC BLOOD PRESSURE: 78 MMHG

## 2022-12-23 PROCEDURE — 99213 OFFICE O/P EST LOW 20 MIN: CPT

## 2022-12-23 PROCEDURE — ZZZZZ: CPT

## 2022-12-26 LAB
BILIRUB UR QL STRIP: NORMAL
CLARITY UR: CLEAR
COLLECTION METHOD: NORMAL
GLUCOSE UR-MCNC: NORMAL
HCG UR QL: 0.2 EU/DL
HGB UR QL STRIP.AUTO: NORMAL
KETONES UR-MCNC: NORMAL
LEUKOCYTE ESTERASE UR QL STRIP: NORMAL
NITRITE UR QL STRIP: NORMAL
PH UR STRIP: 6
PROT UR STRIP-MCNC: NORMAL
SP GR UR STRIP: 1.01

## 2023-01-03 ENCOUNTER — NON-APPOINTMENT (OUTPATIENT)
Age: 28
End: 2023-01-03

## 2023-01-05 ENCOUNTER — APPOINTMENT (OUTPATIENT)
Dept: ANTEPARTUM | Facility: CLINIC | Age: 28
End: 2023-01-05

## 2023-01-06 ENCOUNTER — OUTPATIENT (OUTPATIENT)
Dept: OUTPATIENT SERVICES | Facility: HOSPITAL | Age: 28
LOS: 1 days | Discharge: HOME | End: 2023-01-06

## 2023-01-06 ENCOUNTER — RESULT CHARGE (OUTPATIENT)
Age: 28
End: 2023-01-06

## 2023-01-06 ENCOUNTER — APPOINTMENT (OUTPATIENT)
Dept: OBGYN | Facility: CLINIC | Age: 28
End: 2023-01-06
Payer: MEDICAID

## 2023-01-06 VITALS
SYSTOLIC BLOOD PRESSURE: 110 MMHG | BODY MASS INDEX: 29.03 KG/M2 | HEIGHT: 59 IN | DIASTOLIC BLOOD PRESSURE: 70 MMHG | WEIGHT: 144 LBS

## 2023-01-06 PROCEDURE — 99213 OFFICE O/P EST LOW 20 MIN: CPT

## 2023-01-11 ENCOUNTER — OUTPATIENT (OUTPATIENT)
Dept: OUTPATIENT SERVICES | Facility: HOSPITAL | Age: 28
LOS: 1 days | Discharge: HOME | End: 2023-01-11

## 2023-01-11 ENCOUNTER — ASOB RESULT (OUTPATIENT)
Age: 28
End: 2023-01-11

## 2023-01-11 ENCOUNTER — APPOINTMENT (OUTPATIENT)
Dept: ANTEPARTUM | Facility: CLINIC | Age: 28
End: 2023-01-11
Payer: MEDICAID

## 2023-01-11 PROCEDURE — 76816 OB US FOLLOW-UP PER FETUS: CPT | Mod: 26

## 2023-01-13 ENCOUNTER — OUTPATIENT (OUTPATIENT)
Dept: OUTPATIENT SERVICES | Facility: HOSPITAL | Age: 28
LOS: 1 days | Discharge: HOME | End: 2023-01-13

## 2023-01-13 ENCOUNTER — RESULT CHARGE (OUTPATIENT)
Age: 28
End: 2023-01-13

## 2023-01-13 ENCOUNTER — APPOINTMENT (OUTPATIENT)
Dept: OBGYN | Facility: CLINIC | Age: 28
End: 2023-01-13
Payer: MEDICAID

## 2023-01-13 VITALS
HEIGHT: 59 IN | WEIGHT: 146 LBS | DIASTOLIC BLOOD PRESSURE: 70 MMHG | SYSTOLIC BLOOD PRESSURE: 110 MMHG | BODY MASS INDEX: 29.43 KG/M2

## 2023-01-13 DIAGNOSIS — Z34.93 ENCOUNTER FOR SUPERVISION OF NORMAL PREGNANCY, UNSPECIFIED, THIRD TRIMESTER: ICD-10-CM

## 2023-01-13 PROCEDURE — 99213 OFFICE O/P EST LOW 20 MIN: CPT

## 2023-01-15 LAB
BILIRUB UR QL STRIP: NORMAL
CLARITY UR: CLEAR
COLLECTION METHOD: NORMAL
GLUCOSE UR-MCNC: NORMAL
GP B STREP DNA SPEC QL NAA+PROBE: NOT DETECTED
HCG UR QL: 0.2 EU/DL
HGB UR QL STRIP.AUTO: NORMAL
KETONES UR-MCNC: NORMAL
LEUKOCYTE ESTERASE UR QL STRIP: NORMAL
NITRITE UR QL STRIP: NORMAL
PH UR STRIP: 6.5
PROT UR STRIP-MCNC: NORMAL
SOURCE GBS: NORMAL
SP GR UR STRIP: 1.01

## 2023-01-17 ENCOUNTER — APPOINTMENT (OUTPATIENT)
Dept: OBGYN | Facility: CLINIC | Age: 28
End: 2023-01-17
Payer: MEDICAID

## 2023-01-17 ENCOUNTER — OUTPATIENT (OUTPATIENT)
Dept: OUTPATIENT SERVICES | Facility: HOSPITAL | Age: 28
LOS: 1 days | Discharge: HOME | End: 2023-01-17

## 2023-01-17 VITALS
SYSTOLIC BLOOD PRESSURE: 122 MMHG | HEIGHT: 59 IN | WEIGHT: 146 LBS | DIASTOLIC BLOOD PRESSURE: 75 MMHG | BODY MASS INDEX: 29.43 KG/M2 | HEART RATE: 87 BPM

## 2023-01-17 LAB
C TRACH RRNA SPEC QL NAA+PROBE: NOT DETECTED
N GONORRHOEA RRNA SPEC QL NAA+PROBE: NOT DETECTED
SOURCE AMPLIFICATION: NORMAL

## 2023-01-17 PROCEDURE — 99213 OFFICE O/P EST LOW 20 MIN: CPT

## 2023-01-19 DIAGNOSIS — Z34.83 ENCOUNTER FOR SUPERVISION OF OTHER NORMAL PREGNANCY, THIRD TRIMESTER: ICD-10-CM

## 2023-01-19 DIAGNOSIS — Z34.93 ENCOUNTER FOR SUPERVISION OF NORMAL PREGNANCY, UNSPECIFIED, THIRD TRIMESTER: ICD-10-CM

## 2023-01-23 DIAGNOSIS — Z03.74 ENCOUNTER FOR SUSPECTED PROBLEM WITH FETAL GROWTH RULED OUT: ICD-10-CM

## 2023-01-23 DIAGNOSIS — Z34.83 ENCOUNTER FOR SUPERVISION OF OTHER NORMAL PREGNANCY, THIRD TRIMESTER: ICD-10-CM

## 2023-01-23 DIAGNOSIS — Z3A.36 36 WEEKS GESTATION OF PREGNANCY: ICD-10-CM

## 2023-01-27 ENCOUNTER — APPOINTMENT (OUTPATIENT)
Dept: OBGYN | Facility: CLINIC | Age: 28
End: 2023-01-27
Payer: MEDICAID

## 2023-01-27 ENCOUNTER — OUTPATIENT (OUTPATIENT)
Dept: OUTPATIENT SERVICES | Facility: HOSPITAL | Age: 28
LOS: 1 days | Discharge: HOME | End: 2023-01-27

## 2023-01-27 ENCOUNTER — RESULT CHARGE (OUTPATIENT)
Age: 28
End: 2023-01-27

## 2023-01-27 VITALS — BODY MASS INDEX: 29.69 KG/M2 | WEIGHT: 147 LBS | DIASTOLIC BLOOD PRESSURE: 75 MMHG | SYSTOLIC BLOOD PRESSURE: 116 MMHG

## 2023-01-27 PROCEDURE — 99213 OFFICE O/P EST LOW 20 MIN: CPT

## 2023-01-30 ENCOUNTER — NON-APPOINTMENT (OUTPATIENT)
Age: 28
End: 2023-01-30

## 2023-01-30 ENCOUNTER — OUTPATIENT (OUTPATIENT)
Dept: OUTPATIENT SERVICES | Facility: HOSPITAL | Age: 28
LOS: 1 days | Discharge: HOME | End: 2023-01-30
Payer: MEDICAID

## 2023-01-30 VITALS — SYSTOLIC BLOOD PRESSURE: 131 MMHG | DIASTOLIC BLOOD PRESSURE: 77 MMHG | HEART RATE: 82 BPM

## 2023-01-30 VITALS
TEMPERATURE: 98 F | DIASTOLIC BLOOD PRESSURE: 77 MMHG | SYSTOLIC BLOOD PRESSURE: 131 MMHG | HEART RATE: 82 BPM | RESPIRATION RATE: 18 BRPM

## 2023-01-30 LAB — GLUCOSE BLDC GLUCOMTR-MCNC: 96 MG/DL — SIGNIFICANT CHANGE UP (ref 70–99)

## 2023-01-30 PROCEDURE — 76815 OB US LIMITED FETUS(S): CPT | Mod: 26

## 2023-01-30 PROCEDURE — 99215 OFFICE O/P EST HI 40 MIN: CPT | Mod: 25

## 2023-01-30 PROCEDURE — 59025 FETAL NON-STRESS TEST: CPT | Mod: 26

## 2023-01-30 PROCEDURE — 99417 PROLNG OP E/M EACH 15 MIN: CPT | Mod: 25

## 2023-01-30 NOTE — OB PROVIDER TRIAGE NOTE - NSHPPHYSICALEXAM_GEN_ALL_CORE
Vital Signs Last 24 Hrs  T(C): 36.7 (30 Jan 2023 10:22), Max: 36.7 (30 Jan 2023 10:22)  T(F): 98.1 (30 Jan 2023 10:22), Max: 98.1 (30 Jan 2023 10:22)  HR: 82 (30 Jan 2023 10:22) (82 - 82)  BP: 131/77 (30 Jan 2023 10:22) (131/77 - 131/77)  RR: 18 (30 Jan 2023 10:22) (18 - 18)    Parameters below as of 30 Jan 2023 10:22  Patient On (Oxygen Delivery Method): room air    Gen: AOx3, no acute distress  CVS: RRR  Lungs: CTABL  Abd: soft, gravid, non tender, mildly palpable contractions  Ext: No edema bilaterally      EFM:  140/mod/+accels; cat 1  [place]: irreg  SVE: 1/0/-3 vertex intact @1040  Speculum: mucous admix with dark red blood, ferning neg   BSS: vtx, ant placenta, MVP 2.06x3.68 cm, BPP 8/8

## 2023-01-30 NOTE — OB PROVIDER TRIAGE NOTE - NSICDXFAMILYHX_GEN_ALL_CORE_FT
FAMILY HISTORY:  Father  Still living? Unknown  FH: type 1 diabetes, Age at diagnosis: Age Unknown

## 2023-01-30 NOTE — OB PROVIDER TRIAGE NOTE - NSHPLABSRESULTS_GEN_ALL_CORE
First trimester   HIV  NR  HBsAg NR  O pos  Antibody neg  NIPT low risk        GTT 82/180/182/92    Third Trimester   Trep Neg  HIV NR    1/15/23  GBS neg    Sono:    @20w4d: vtx, ant, MVP 4.96cm, anatomy wnl  @32w4d: vtx, ant placenta, MVP 6.11, EFW 1814g 16%ile,   @36w2d: vtx, ant placenta, MVP 5.66, EFW 2678g 29%ile

## 2023-01-30 NOTE — OB RN TRIAGE NOTE - NSNURSINGINSTR_OBGYN_ALL_OB_FT
Follow up with Dr Fletcher on Friday,. Make sure you drink 8-10 glasses water day. Come back to Labor and Delivery if your water breaks, you have regular contractions (every 3-5 min for over an hour)

## 2023-01-30 NOTE — OB PROVIDER TRIAGE NOTE - HISTORY OF PRESENT ILLNESS
27y , @39w0d, TERRELL 23 by LMP c/w first trimester sono, presenting for vaginal spotting. Reports seeing a spot of dark red blood, size of dime, at 0500, and another spot at 0800. Endorses irregular contractions since last night. Denies leakage of fluid. + FM.  Last intercourse 2 nights ago.  Pregnancy complicated by GDMA1. GBS neg     GDMA1  - FFS: 80-86  - 2hPP

## 2023-01-30 NOTE — OB PROVIDER TRIAGE NOTE - ATTENDING COMMENTS
39 weeks  not in labor  EFM reactive  sono 8/8  VS WNL  precautions given  continue prenatal care 39 weeks  not in labor  EFM reactive  sono 8/8  VS WNL  precautions given  continue prenatal care  The patient presented to triage and was evaluated starting at 1015.  She was discharged at 1110. I spent 55minutes caring for the patient.  This included obtaining a history, performing a physical examination, continuously monitoring and interpreting the fetal heart rate and tocometer patterns, ordering and reviewing labs, documenting in the medical record, and periodic reassessment and discussion with the patient.

## 2023-01-30 NOTE — OB PROVIDER TRIAGE NOTE - ADDITIONAL INSTRUCTIONS
Return to L&D if you experience regular painful contractions, leakage of vaginal fluid, vaginal bleeding or decreased fetal movement (< 10 movements in 2 hours).

## 2023-01-30 NOTE — OB PROVIDER TRIAGE NOTE - NSOBPROVIDERNOTE_OBGYN_ALL_OB_FT
27y , @39w0d, GBS neg, not in labor, reassuring maternal and fetal medicine     - Discharge home with labor precautions   - scheduled for IOL on 23    Dr. Garcia and Dr. Rico aware. 27y , @39w0d, GBS neg, not in labor, reassuring maternal and fetal status    - Discharge home with labor precautions   - scheduled for IOL on 23    Dr. Garcia and Dr. Rico aware.

## 2023-01-31 DIAGNOSIS — O47.1 FALSE LABOR AT OR AFTER 37 COMPLETED WEEKS OF GESTATION: ICD-10-CM

## 2023-01-31 DIAGNOSIS — O26.893 OTHER SPECIFIED PREGNANCY RELATED CONDITIONS, THIRD TRIMESTER: ICD-10-CM

## 2023-02-03 ENCOUNTER — RESULT CHARGE (OUTPATIENT)
Age: 28
End: 2023-02-03

## 2023-02-03 ENCOUNTER — APPOINTMENT (OUTPATIENT)
Dept: OBGYN | Facility: CLINIC | Age: 28
End: 2023-02-03
Payer: MEDICAID

## 2023-02-03 ENCOUNTER — OUTPATIENT (OUTPATIENT)
Dept: OUTPATIENT SERVICES | Facility: HOSPITAL | Age: 28
LOS: 1 days | Discharge: HOME | End: 2023-02-03

## 2023-02-03 VITALS
DIASTOLIC BLOOD PRESSURE: 82 MMHG | SYSTOLIC BLOOD PRESSURE: 122 MMHG | BODY MASS INDEX: 30.04 KG/M2 | WEIGHT: 149 LBS | HEIGHT: 59 IN

## 2023-02-03 PROCEDURE — 99213 OFFICE O/P EST LOW 20 MIN: CPT

## 2023-02-04 ENCOUNTER — INPATIENT (INPATIENT)
Facility: HOSPITAL | Age: 28
LOS: 1 days | Discharge: HOME | DRG: 560 | End: 2023-02-06
Attending: STUDENT IN AN ORGANIZED HEALTH CARE EDUCATION/TRAINING PROGRAM | Admitting: STUDENT IN AN ORGANIZED HEALTH CARE EDUCATION/TRAINING PROGRAM
Payer: MEDICAID

## 2023-02-04 VITALS — HEART RATE: 93 BPM | DIASTOLIC BLOOD PRESSURE: 81 MMHG | SYSTOLIC BLOOD PRESSURE: 124 MMHG

## 2023-02-04 LAB
AMPHET UR-MCNC: NEGATIVE — SIGNIFICANT CHANGE UP
APPEARANCE UR: ABNORMAL
BACTERIA # UR AUTO: ABNORMAL
BARBITURATES UR SCN-MCNC: NEGATIVE — SIGNIFICANT CHANGE UP
BASOPHILS # BLD AUTO: 0.03 K/UL — SIGNIFICANT CHANGE UP (ref 0–0.2)
BASOPHILS NFR BLD AUTO: 0.2 % — SIGNIFICANT CHANGE UP (ref 0–1)
BENZODIAZ UR-MCNC: NEGATIVE — SIGNIFICANT CHANGE UP
BILIRUB UR-MCNC: NEGATIVE — SIGNIFICANT CHANGE UP
BLD GP AB SCN SERPL QL: SIGNIFICANT CHANGE UP
BUPRENORPHINE SCREEN, URINE RESULT: NEGATIVE — SIGNIFICANT CHANGE UP
COCAINE METAB.OTHER UR-MCNC: NEGATIVE — SIGNIFICANT CHANGE UP
COLOR SPEC: YELLOW — SIGNIFICANT CHANGE UP
CREAT ?TM UR-MCNC: 99 MG/DL — SIGNIFICANT CHANGE UP
DIFF PNL FLD: ABNORMAL
EOSINOPHIL # BLD AUTO: 0.01 K/UL — SIGNIFICANT CHANGE UP (ref 0–0.7)
EOSINOPHIL NFR BLD AUTO: 0.1 % — SIGNIFICANT CHANGE UP (ref 0–8)
EPI CELLS # UR: >27 /HPF — HIGH (ref 0–5)
FENTANYL UR QL: NEGATIVE — SIGNIFICANT CHANGE UP
GLUCOSE BLDC GLUCOMTR-MCNC: 77 MG/DL — SIGNIFICANT CHANGE UP (ref 70–99)
GLUCOSE BLDC GLUCOMTR-MCNC: 83 MG/DL — SIGNIFICANT CHANGE UP (ref 70–99)
GLUCOSE BLDC GLUCOMTR-MCNC: 84 MG/DL — SIGNIFICANT CHANGE UP (ref 70–99)
GLUCOSE UR QL: NEGATIVE — SIGNIFICANT CHANGE UP
HCT VFR BLD CALC: 37.3 % — SIGNIFICANT CHANGE UP (ref 37–47)
HGB BLD-MCNC: 13 G/DL — SIGNIFICANT CHANGE UP (ref 12–16)
HYALINE CASTS # UR AUTO: 35 /LPF — HIGH (ref 0–7)
IMM GRANULOCYTES NFR BLD AUTO: 0.8 % — HIGH (ref 0.1–0.3)
KETONES UR-MCNC: NEGATIVE — SIGNIFICANT CHANGE UP
L&D DRUG SCREEN, URINE: SIGNIFICANT CHANGE UP
LEUKOCYTE ESTERASE UR-ACNC: ABNORMAL
LYMPHOCYTES # BLD AUTO: 1.72 K/UL — SIGNIFICANT CHANGE UP (ref 1.2–3.4)
LYMPHOCYTES # BLD AUTO: 13.3 % — LOW (ref 20.5–51.1)
MCHC RBC-ENTMCNC: 30.3 PG — SIGNIFICANT CHANGE UP (ref 27–31)
MCHC RBC-ENTMCNC: 34.9 G/DL — SIGNIFICANT CHANGE UP (ref 32–37)
MCV RBC AUTO: 86.9 FL — SIGNIFICANT CHANGE UP (ref 81–99)
METHADONE UR-MCNC: NEGATIVE — SIGNIFICANT CHANGE UP
MONOCYTES # BLD AUTO: 0.46 K/UL — SIGNIFICANT CHANGE UP (ref 0.1–0.6)
MONOCYTES NFR BLD AUTO: 3.6 % — SIGNIFICANT CHANGE UP (ref 1.7–9.3)
NEUTROPHILS # BLD AUTO: 10.63 K/UL — HIGH (ref 1.4–6.5)
NEUTROPHILS NFR BLD AUTO: 82 % — HIGH (ref 42.2–75.2)
NITRITE UR-MCNC: NEGATIVE — SIGNIFICANT CHANGE UP
NRBC # BLD: 0 /100 WBCS — SIGNIFICANT CHANGE UP (ref 0–0)
OPIATES UR-MCNC: NEGATIVE — SIGNIFICANT CHANGE UP
OXYCODONE UR-MCNC: NEGATIVE — SIGNIFICANT CHANGE UP
PCP UR-MCNC: NEGATIVE — SIGNIFICANT CHANGE UP
PH UR: 6 — SIGNIFICANT CHANGE UP (ref 5–8)
PLATELET # BLD AUTO: 125 K/UL — LOW (ref 130–400)
PRENATAL SYPHILIS TEST: SIGNIFICANT CHANGE UP
PROPOXYPHENE QUALITATIVE URINE RESULT: NEGATIVE — SIGNIFICANT CHANGE UP
PROT ?TM UR-MCNC: 440 MG/DLG/24H — SIGNIFICANT CHANGE UP
PROT UR-MCNC: ABNORMAL
PROT/CREAT UR-RTO: 4.4 RATIO — HIGH (ref 0–0.2)
RBC # BLD: 4.29 M/UL — SIGNIFICANT CHANGE UP (ref 4.2–5.4)
RBC # FLD: 14.6 % — HIGH (ref 11.5–14.5)
RBC CASTS # UR COMP ASSIST: 38 /HPF — HIGH (ref 0–4)
SARS-COV-2 RNA SPEC QL NAA+PROBE: SIGNIFICANT CHANGE UP
SP GR SPEC: 1.03 — SIGNIFICANT CHANGE UP (ref 1.01–1.03)
UROBILINOGEN FLD QL: SIGNIFICANT CHANGE UP
WBC # BLD: 12.95 K/UL — HIGH (ref 4.8–10.8)
WBC # FLD AUTO: 12.95 K/UL — HIGH (ref 4.8–10.8)
WBC UR QL: 78 /HPF — HIGH (ref 0–5)

## 2023-02-04 PROCEDURE — 81001 URINALYSIS AUTO W/SCOPE: CPT

## 2023-02-04 PROCEDURE — 82570 ASSAY OF URINE CREATININE: CPT

## 2023-02-04 PROCEDURE — 86900 BLOOD TYPING SEROLOGIC ABO: CPT

## 2023-02-04 PROCEDURE — 86765 RUBEOLA ANTIBODY: CPT

## 2023-02-04 PROCEDURE — 85025 COMPLETE CBC W/AUTO DIFF WBC: CPT

## 2023-02-04 PROCEDURE — 86592 SYPHILIS TEST NON-TREP QUAL: CPT

## 2023-02-04 PROCEDURE — 80354 DRUG SCREENING FENTANYL: CPT

## 2023-02-04 PROCEDURE — 99214 OFFICE O/P EST MOD 30 MIN: CPT

## 2023-02-04 PROCEDURE — 84156 ASSAY OF PROTEIN URINE: CPT

## 2023-02-04 PROCEDURE — 86735 MUMPS ANTIBODY: CPT

## 2023-02-04 PROCEDURE — 59409 OBSTETRICAL CARE: CPT | Mod: U9

## 2023-02-04 PROCEDURE — 80307 DRUG TEST PRSMV CHEM ANLYZR: CPT

## 2023-02-04 PROCEDURE — 86901 BLOOD TYPING SEROLOGIC RH(D): CPT

## 2023-02-04 PROCEDURE — 86850 RBC ANTIBODY SCREEN: CPT

## 2023-02-04 PROCEDURE — 82962 GLUCOSE BLOOD TEST: CPT

## 2023-02-04 PROCEDURE — 83615 LACTATE (LD) (LDH) ENZYME: CPT

## 2023-02-04 PROCEDURE — 36415 COLL VENOUS BLD VENIPUNCTURE: CPT

## 2023-02-04 PROCEDURE — 86762 RUBELLA ANTIBODY: CPT

## 2023-02-04 PROCEDURE — 80053 COMPREHEN METABOLIC PANEL: CPT

## 2023-02-04 RX ORDER — SODIUM CHLORIDE 9 MG/ML
1000 INJECTION, SOLUTION INTRAVENOUS
Refills: 0 | Status: DISCONTINUED | OUTPATIENT
Start: 2023-02-04 | End: 2023-02-04

## 2023-02-04 RX ORDER — LANOLIN
1 OINTMENT (GRAM) TOPICAL EVERY 6 HOURS
Refills: 0 | Status: DISCONTINUED | OUTPATIENT
Start: 2023-02-04 | End: 2023-02-06

## 2023-02-04 RX ORDER — OXYTOCIN 10 UNIT/ML
2 VIAL (ML) INJECTION
Qty: 30 | Refills: 0 | Status: DISCONTINUED | OUTPATIENT
Start: 2023-02-04 | End: 2023-02-04

## 2023-02-04 RX ORDER — AER TRAVELER 0.5 G/1
1 SOLUTION RECTAL; TOPICAL EVERY 4 HOURS
Refills: 0 | Status: DISCONTINUED | OUTPATIENT
Start: 2023-02-04 | End: 2023-02-06

## 2023-02-04 RX ORDER — OXYTOCIN 10 UNIT/ML
41.67 VIAL (ML) INJECTION
Qty: 20 | Refills: 0 | Status: DISCONTINUED | OUTPATIENT
Start: 2023-02-04 | End: 2023-02-06

## 2023-02-04 RX ORDER — FENTANYL/BUPIVACAINE/NS/PF 2MCG/ML-.1
250 PLASTIC BAG, INJECTION (ML) INJECTION
Refills: 0 | Status: DISCONTINUED | OUTPATIENT
Start: 2023-02-04 | End: 2023-02-04

## 2023-02-04 RX ORDER — BENZOCAINE 10 %
1 GEL (GRAM) MUCOUS MEMBRANE EVERY 6 HOURS
Refills: 0 | Status: DISCONTINUED | OUTPATIENT
Start: 2023-02-04 | End: 2023-02-06

## 2023-02-04 RX ORDER — CITRIC ACID/SODIUM CITRATE 300-500 MG
15 SOLUTION, ORAL ORAL EVERY 6 HOURS
Refills: 0 | Status: DISCONTINUED | OUTPATIENT
Start: 2023-02-04 | End: 2023-02-04

## 2023-02-04 RX ORDER — SODIUM CHLORIDE 9 MG/ML
3 INJECTION INTRAMUSCULAR; INTRAVENOUS; SUBCUTANEOUS EVERY 8 HOURS
Refills: 0 | Status: DISCONTINUED | OUTPATIENT
Start: 2023-02-04 | End: 2023-02-06

## 2023-02-04 RX ORDER — OXYTOCIN 10 UNIT/ML
333.33 VIAL (ML) INJECTION
Qty: 20 | Refills: 0 | Status: DISCONTINUED | OUTPATIENT
Start: 2023-02-04 | End: 2023-02-04

## 2023-02-04 RX ORDER — SIMETHICONE 80 MG/1
80 TABLET, CHEWABLE ORAL EVERY 4 HOURS
Refills: 0 | Status: DISCONTINUED | OUTPATIENT
Start: 2023-02-04 | End: 2023-02-06

## 2023-02-04 RX ORDER — KETOROLAC TROMETHAMINE 30 MG/ML
30 SYRINGE (ML) INJECTION ONCE
Refills: 0 | Status: DISCONTINUED | OUTPATIENT
Start: 2023-02-04 | End: 2023-02-04

## 2023-02-04 RX ORDER — HYDROCORTISONE 1 %
1 OINTMENT (GRAM) TOPICAL EVERY 6 HOURS
Refills: 0 | Status: DISCONTINUED | OUTPATIENT
Start: 2023-02-04 | End: 2023-02-06

## 2023-02-04 RX ORDER — DIPHENHYDRAMINE HCL 50 MG
25 CAPSULE ORAL EVERY 6 HOURS
Refills: 0 | Status: DISCONTINUED | OUTPATIENT
Start: 2023-02-04 | End: 2023-02-06

## 2023-02-04 RX ORDER — DIBUCAINE 1 %
1 OINTMENT (GRAM) RECTAL EVERY 6 HOURS
Refills: 0 | Status: DISCONTINUED | OUTPATIENT
Start: 2023-02-04 | End: 2023-02-06

## 2023-02-04 RX ORDER — PRAMOXINE HYDROCHLORIDE 150 MG/15G
1 AEROSOL, FOAM RECTAL EVERY 4 HOURS
Refills: 0 | Status: DISCONTINUED | OUTPATIENT
Start: 2023-02-04 | End: 2023-02-06

## 2023-02-04 RX ORDER — IBUPROFEN 200 MG
600 TABLET ORAL EVERY 6 HOURS
Refills: 0 | Status: COMPLETED | OUTPATIENT
Start: 2023-02-04 | End: 2024-01-03

## 2023-02-04 RX ORDER — NALOXONE HYDROCHLORIDE 4 MG/.1ML
0.1 SPRAY NASAL
Refills: 0 | Status: DISCONTINUED | OUTPATIENT
Start: 2023-02-04 | End: 2023-02-04

## 2023-02-04 RX ORDER — ACETAMINOPHEN 500 MG
975 TABLET ORAL
Refills: 0 | Status: DISCONTINUED | OUTPATIENT
Start: 2023-02-04 | End: 2023-02-06

## 2023-02-04 RX ORDER — OXYCODONE HYDROCHLORIDE 5 MG/1
5 TABLET ORAL ONCE
Refills: 0 | Status: DISCONTINUED | OUTPATIENT
Start: 2023-02-04 | End: 2023-02-06

## 2023-02-04 RX ORDER — MAGNESIUM HYDROXIDE 400 MG/1
30 TABLET, CHEWABLE ORAL
Refills: 0 | Status: DISCONTINUED | OUTPATIENT
Start: 2023-02-04 | End: 2023-02-06

## 2023-02-04 RX ORDER — CHLORHEXIDINE GLUCONATE 213 G/1000ML
1 SOLUTION TOPICAL ONCE
Refills: 0 | Status: DISCONTINUED | OUTPATIENT
Start: 2023-02-04 | End: 2023-02-04

## 2023-02-04 RX ORDER — ONDANSETRON 8 MG/1
4 TABLET, FILM COATED ORAL EVERY 6 HOURS
Refills: 0 | Status: DISCONTINUED | OUTPATIENT
Start: 2023-02-04 | End: 2023-02-04

## 2023-02-04 RX ORDER — OXYCODONE HYDROCHLORIDE 5 MG/1
5 TABLET ORAL
Refills: 0 | Status: DISCONTINUED | OUTPATIENT
Start: 2023-02-04 | End: 2023-02-06

## 2023-02-04 RX ORDER — TETANUS TOXOID, REDUCED DIPHTHERIA TOXOID AND ACELLULAR PERTUSSIS VACCINE, ADSORBED 5; 2.5; 8; 8; 2.5 [IU]/.5ML; [IU]/.5ML; UG/.5ML; UG/.5ML; UG/.5ML
0.5 SUSPENSION INTRAMUSCULAR ONCE
Refills: 0 | Status: DISCONTINUED | OUTPATIENT
Start: 2023-02-04 | End: 2023-02-06

## 2023-02-04 RX ORDER — DEXAMETHASONE 0.5 MG/5ML
4 ELIXIR ORAL EVERY 6 HOURS
Refills: 0 | Status: DISCONTINUED | OUTPATIENT
Start: 2023-02-04 | End: 2023-02-04

## 2023-02-04 RX ADMIN — Medication 2 MILLIUNIT(S)/MIN: at 13:55

## 2023-02-04 RX ADMIN — SODIUM CHLORIDE 125 MILLILITER(S): 9 INJECTION, SOLUTION INTRAVENOUS at 13:54

## 2023-02-04 RX ADMIN — Medication 30 MILLIGRAM(S): at 23:49

## 2023-02-04 RX ADMIN — Medication 125 MILLIUNIT(S)/MIN: at 23:49

## 2023-02-04 NOTE — OB RN PATIENT PROFILE - FALL HARM RISK - UNIVERSAL INTERVENTIONS
Bed in lowest position, wheels locked, appropriate side rails in place/Call bell, personal items and telephone in reach/Instruct patient to call for assistance before getting out of bed or chair/Non-slip footwear when patient is out of bed/[place] to call system/Physically safe environment - no spills, clutter or unnecessary equipment/Purposeful Proactive Rounding/Room/bathroom lighting operational, light cord in reach

## 2023-02-04 NOTE — OB PROVIDER DELIVERY SUMMARY - NSVAGINALEXAMCERT_OBGYN_ALL_OB
Follow up lactation call: Mother states breastfeeding is going well. I Mother has no questions or concerns for lactation at this time. Warmline number provided to mother and she was encouraged to call for assistance as needed.    The Delivery OB Provider certifies that vaginal examination and/or abdominal examination after the delivery was done and no foreign body was found.

## 2023-02-04 NOTE — OB PROVIDER H&P - HISTORY OF PRESENT ILLNESS
27y , @39w5d, TERRELL 23 by LMP c/w first trimester sono, presenting for painful contractions since 0300.  Reports contractions occuring every 2-4min, 7-8/10 in pain. Endorses vaginal mucos discharge blood-tinged, but no gush of fluid.   + FM.  Pregnancy complicated by GDMA1. GBS neg     GDMA1  - FFS: 80-86  - 2hPP

## 2023-02-04 NOTE — OB PROVIDER H&P - ASSESSMENT
27y  @39w5d, GBS neg, IOL for GDMA1    -admit to labor and delivery  -pain management prn   -continous efm & toco  -admission labs  -IV access   -IV hydration   -diet: clear liquid diet     Dr. Garcia and Dr. Ugarte aware.    27y  @39w5d, GBS neg, IOL for GDMA1    -admit to labor and delivery  -pain management prn   -continous efm & toco  -admission labs  -IV access   -IV hydration   -diet: clear liquid diet

## 2023-02-04 NOTE — OB PROVIDER H&P - NSICDXPASTSURGICALHX_GEN_ALL_CORE_FT
Subjective:       Chief Complaint  The patient presents for follow up of hypertension and high cholesterol. prediabetes  chronic back pain and right hip pain, COPD        HPI  Gregorio Mcneal is a 77 y.o. male seen for follow up of hyperlipidemia. Nathalia has hypertension. Hypertension well controlled, no significant medication side effects noted, on Lopressor, hyperlipidemia uncontrolled, pt has been unable to tolerate statins due to myalgias. The 10-year ASCVD risk score (Geraldine Contreras., et al., 2013) is: 32.4%      Diet and Lifestyle: not attempting to follow a low fat, low cholesterol diet, exercises sporadically    Home BP Monitoring: is not measured at home. Gregorio Mcneal RTC today to follow up on chronic pain diagnosis. We discussed his osteoarthritis that is affecting his back. Significant changes since last visit: pt is having neurogenic claudication and has pain with standing and walking in both legs. He is  able to do his normal daily activities. He reports the following adverse side effects: none. Pt doing fairly well with Hysingla ER 60 mg. [facility] is [facility] and was referred for aqua therapy but did not tolerate it due to pain. His MRI of his LS shows worsening DJD so he will f/u with the [facility]. Lyrica has helped to neurogenic pain so we will go ahead and increase the dose and see if this helps him. Patient will also benefit from a back brace to help decrease his chronic back pain. Least pain over the last week has been 3/10. Worst pain over the last week has been 10/10. Opioid Risk Tool Reviewed: YES    Aberrant behaviors: None. Urine Drug Screen: reviewed and up to date. Controlled substance agreement on file: YES.  reviewed:yes    Pill count is consistent with his prescription: yes    Concomitant use of a benzodiazepine: no        Prediabetes: pt is trying to control with diet and weight loss but he continues to struggle to lose weight.   His BMI of 38 puts him at high risk of multiple medical problems. COPD Review:  The patient is being seen for follow up of COPD. Oxygen: He currently is not on home oxygen therapy. He is using Anoro Ellipta with good results. Symptoms: chronic dyspnea: severity = mild: course of sx: symptoms have progressed to a point and plateaued. .   Patient uses 0 pillows at night. Patient does smoke cigarettes. Pt has sleep apnea is trying to use CPAP on a regular basis. He will f/u with his sleep specialist Dr Ozzie Walters. He is having some insomnia for which we will try trazodone. Patient has had some increased azotemia with creatinine increasing 1.46. Patient will try and avoid NSAIDs and will recheck creatinine in 4 weeks. Patient will try and hydrate himself well. Current Outpatient Medications   Medication Sig    traZODone (DESYREL) 50 mg tablet TAKE 1-2 TABLETs BY MOUTH ONCE DAILY AT BEDTIME AS NEEDED FOR SLEEP    latanoprost (XALATAN) 0.005 % ophthalmic solution INSTILL 1 DROP INTO EACH EYE AT BEDTIME    pregabalin (LYRICA) 150 mg capsule Take 1 Capsule by mouth two (2) times a day. Max Daily Amount: 300 mg.    metoprolol tartrate (LOPRESSOR) 25 mg tablet TAKE 1 TABLET BY MOUTH TWICE DAILY (GENERIC  FOR  LOPRESSOR)    umeclidinium-vilanteroL (Anoro Ellipta) 62.5-25 mcg/actuation inhaler Take 1 Puff by inhalation daily.  albuterol (PROVENTIL HFA, VENTOLIN HFA, PROAIR HFA) 90 mcg/actuation inhaler INHALE 2 PUFFS BY MOUTH EVERY 4 HOURS AS NEEDED FOR WHEEZING    tamsulosin (FLOMAX) 0.4 mg capsule TAKE 1 CAPSULE BY MOUTH ONCE DAILY AFTER SUPPER    ibuprofen (MOTRIN) 800 mg tablet TAKE 1 TABLET BY MOUTH EVERY 8 HOURS AS NEEDED FOR PAIN    montelukast (SINGULAIR) 10 mg tablet Take 1 tablet by mouth once daily    azelastine (ASTELIN) 137 mcg (0.1 %) nasal spray 2 Sprays by Both Nostrils route two (2) times a day.  Use in each nostril as directed  Indications: seasonal runny nose    fluticasone propionate (FLONASE) 50 mcg/actuation nasal spray USE TWO SPRAY(S) IN EACH NOSTRIL ONCE DAILY    aspirin delayed-release 81 mg tablet Take 81 mg by mouth daily.  pantoprazole (PROTONIX) 40 mg tablet TAKE ONE TABLET BY MOUTH ONCE DAILY    tiZANidine (ZANAFLEX) 4 mg tablet Take 1 Tab by mouth three (3) times daily as needed for Muscle Spasm(s).  lactulose (CONSTULOSE) 10 gram/15 mL solution Take 45ml PO TID    guaiFENesin ER (MUCINEX) 600 mg ER tablet Take 1 Tab by mouth two (2) times a day.  HYDROcodone bitartrate (HYSINGLA) 60 mg ER tablet Take 1 Tablet by mouth daily for 30 days. Max Daily Amount: 60 mg. *DO NOT CRUSH,CHEW, OR DISSOLVE TABLET* (Patient not taking: Reported on 7/16/2021)    nitrofurantoin, macrocrystal-monohydrate, (MACROBID) 100 mg capsule Take 1 Cap by mouth two (2) times a day. (Patient not taking: Reported on 7/16/2021)    Narcan 4 mg/actuation nasal spray ADMINISTER A SINGLE SPRAY IN ONE NOSTRIL UPON SIGNS OF OPIOID OVERDOSE. CALL 911. REPEAT AFTER 3 MINUTES IF NO RESPONSE. (Patient not taking: Reported on 7/16/2021)    nitroglycerin (NITROSTAT) 0.4 mg SL tablet 1 Tab by SubLINGual route every five (5) minutes as needed. (Patient not taking: Reported on 7/16/2021)    TRAVATAN Z 0.004 % ophthalmic solution Administer 1 Drop to both eyes nightly. (Patient not taking: Reported on 7/16/2021)     No current facility-administered medications for this visit.              Review of Systems  Respiratory: negative for dyspnea on exertion  Cardiovascular: negative for chest pain    Objective:     Visit Vitals  /75 (BP 1 Location: Left upper arm, BP Patient Position: Sitting, BP Cuff Size: Adult)   Pulse 60   Temp 96.9 °F (36.1 °C) (Temporal)   Resp 16   Ht 5' 9\" (1.753 m)   Wt 258 lb 6.4 oz (117.2 kg)   SpO2 96%   BMI 38.16 kg/m²        General appearance - alert, well appearing, and in no distress  Neck - supple, no significant adenopathy, carotids upstroke normal bilaterally, no bruits  Chest - clear to auscultation, no wheezes, rales or rhonchi, symmetric air entry  Heart - normal rate, regular rhythm, normal S1, S2, no murmurs, rubs, clicks or gallops  Extremities - peripheral pulses normal, no pedal edema, no clubbing or cyanosis  Skin - normal coloration and turgor, no rashes, no suspicious skin lesions noted      Labs:   Lab Results   Component Value Date/Time    Hemoglobin A1c 5.8 (H) 07/09/2021 12:00 AM    Hemoglobin A1c 5.9 (H) 03/05/2021 09:08 AM    Hemoglobin A1c 6.0 (H) 11/06/2020 08:53 AM    Glucose 96 07/09/2021 12:00 AM    Glucose (POC) 123 (H) 01/24/2016 04:57 PM    Glucose,  (H) 04/01/2015 08:56 AM    Microalb/Creat ratio (ug/mg creat.) 4.4 04/29/2015 03:00 PM    LDL, calculated 146 (H) 07/09/2021 12:00 AM    LDL, calculated 131 (H) 07/28/2020 09:54 AM    Creatinine 1.46 (H) 07/09/2021 12:00 AM      Lab Results   Component Value Date/Time    Cholesterol, total 204 (H) 07/09/2021 12:00 AM    HDL Cholesterol 37 (L) 07/09/2021 12:00 AM    LDL, calculated 146 (H) 07/09/2021 12:00 AM    LDL, calculated 131 (H) 07/28/2020 09:54 AM    Triglyceride 113 07/09/2021 12:00 AM    CHOL/HDL Ratio 4.4 11/15/2019 09:05 AM     Lab Results   Component Value Date/Time    ALT (SGPT) 10 07/09/2021 12:00 AM    Alk.  phosphatase 98 07/09/2021 12:00 AM    Bilirubin, total <0.2 07/09/2021 12:00 AM     Lab Results   Component Value Date/Time    GFR est AA 56 (L) 07/09/2021 12:00 AM    GFR est non-AA 49 (L) 07/09/2021 12:00 AM    Creatinine 1.46 (H) 07/09/2021 12:00 AM    BUN 27 07/09/2021 12:00 AM    BUN, POC 6 (L) 04/01/2015 08:56 AM    Sodium,  04/01/2015 08:56 AM    Sodium 142 07/09/2021 12:00 AM    Potassium 4.9 07/09/2021 12:00 AM    Potassium, POC 4.0 04/01/2015 08:56 AM    Chloride,  04/01/2015 08:56 AM    Chloride 108 (H) 07/09/2021 12:00 AM    CO2 15 (L) 07/09/2021 12:00 AM      Lab Results   Component Value Date/Time    Glucose 96 07/09/2021 12:00 AM    Glucose (POC) 123 (H) 01/24/2016 04:57 PM Glucose,  (H) 04/01/2015 08:56 AM            Assessment / Plan     Hypertension well controlled, on Lopressor   Hyperlipidemia uncontrolled, pt unable to tolerate statins. ICD-10-CM ICD-9-CM    1. Essential hypertension  D29 075.6 METABOLIC PANEL, BASIC   2. Hypercholesterolemia  E78.00 272.0    3. Chronic midline low back pain with left-sided sciatica  M54.42 724.2  not optimally controlled we will continue Hysingla and increase pregabalin (LYRICA) 150 mg capsule twice daily    G89.29 724.3      338.29    4. Primary insomnia  F51.01 307.42  will treat with traZODone (DESYREL) 50 mg tablet   5. Neurogenic claudication  M48.062 724.03  we will see how patient does with Lyrica and he will follow up with spine center   6. Prediabetes  R73.03 790.29  controlled currently with diet   7. Azotemia  R79.89 790.6  will hold NSAIDs and hydrate himself and will recheck METABOLIC PANEL, BASIC             Low cholesterol diet, weight control and daily exercise discussed. Follow-up and Dispositions    · Return in about 4 weeks (around 8/13/2021) for labs 1 week before. Reviewed plan of care. Patient has provided input and agrees with goals. PAST SURGICAL HISTORY:  No significant past surgical history

## 2023-02-04 NOTE — OB RN DELIVERY SUMMARY - NSSELHIDDEN_OBGYN_ALL_OB_FT
[NS_DeliveryRN_OBGYN_ALL_OB_FT:[id number]] [NS_DeliveryRN_OBGYN_ALL_OB_FT:[id number]],[NS_DeliveryAttending1_OBGYN_ALL_OB_FT:[id number]=],[NS_DeliveryAssist1_OBGYN_ALL_OB_FT:[id number]=],[NS_DeliveryAssist2_OBGYN_ALL_OB_FT:[id number]] [NS_DeliveryRN_OBGYN_ALL_OB_FT:[id number]=],[NS_DeliveryAttending1_OBGYN_ALL_OB_FT:[id number]=],[NS_DeliveryAssist1_OBGYN_ALL_OB_FT:[id number]=],[NS_DeliveryAssist2_OBGYN_ALL_OB_FT:[id number]=],[NS_CirculateRN2_OBGYN_ALL_OB_FT:[id number]=]

## 2023-02-04 NOTE — OB PROVIDER H&P - NSHPPHYSICALEXAM_GEN_ALL_CORE
Vital Signs Last 24 Hrs  T(C): 36.9 (04 Feb 2023 12:20), Max: 36.9 (04 Feb 2023 12:20)  T(F): 98.4 (04 Feb 2023 12:20), Max: 98.4 (04 Feb 2023 12:20)  HR: 93 (04 Feb 2023 12:20) (93 - 93)  BP: 124/81 (04 Feb 2023 12:20) (124/81 - 124/81)  RR: 20 (04 Feb 2023 12:20) (20 - 20)    Gen: AOx3, no acute distress  CVS: RRR  Lungs: CTABL  Abd: soft, gravid, non tender, mildly palpable contractions  Ext: No edema bilaterally    EFM:  145/mod/+accels; cat 1  [place]: q2-4  SVE: 1.5/80/-2  vertex intact @1237  BSS: vtx, post placenta

## 2023-02-04 NOTE — OB PROVIDER H&P - NSLOWPPHRISK_OBGYN_A_OB
No previous uterine incision/Landa Pregnancy/Less than or equal to 4 previous vaginal births/No known bleeding disorder/No history of postpartum hemorrhage/No other PPH risks indicated

## 2023-02-04 NOTE — OB PROVIDER DELIVERY SUMMARY - NSPROVIDERDELIVERYNOTE_OBGYN_ALL_OB_FT
Patient was fully dilated and pushing. Head delivered in OA and restituted to LOT. Anterior shoulder delivered followed by posterior shoulder atraumatically, then rest of the body. Terminal meconium noted following delivery of baby. The baby was suctioned and placed on mother's abdomen. Delayed cord clamping performed. Cord clamped and cut. Cord gases obtained. Placenta was spontaneously delivered intact. Fundus was massaged and firm. Good hemostasis was noted. Cervix and vagina were inspected. Second degree perineal laceration was noted and repaired in the usual fashion with 2-0 chromic, with good hemostasis. Viable female delivered APGARS 8/9 weighing 3240g.    Dr. Garcia and Dr. Ugarte present for entire delivery.

## 2023-02-04 NOTE — OB PROVIDER H&P - ATTENDING COMMENTS
Agree with above : 27y  @39w5d, GBS neg, GDMA1 in early labor being admitted for labor augmentation. VSS. Maternal and fetal status reassuring. Patient desires pain management.   Patient aware of IOL/ augmentation process and plan for oxytocin and possible AROM. Risks of labor augmentation discussed and include hemorrhage, infection, possibility of  delivery in event of failed IOL, arrest of dilation or descent of fetal head or nonreassuring fetal status remote from delivery. Patient endorsed understanding

## 2023-02-04 NOTE — OB RN PATIENT PROFILE - EDUCATION OF THE PLACEMENT OF INFANT DURING SKIN TO SKIN: THE INFANT IS TO BE PLACED BELLY DOWN DIRECTLY ON PARENT'S CHEST, POSITIONED WITH INFANT'S FACE TOWARD PARENT'S FACE, SO THE PARENT CAN OBSERVE THE INFANT’S COLOR AT ALL TIMES.
Acute on chronic congestive heart failure, unspecified congestive heart failure type Statement Selected

## 2023-02-04 NOTE — OB PROVIDER DELIVERY SUMMARY - NSSELHIDDEN_OBGYN_ALL_OB_FT
[NS_DeliveryRN_OBGYN_ALL_OB_FT:[id number]],[NS_DeliveryAttending1_OBGYN_ALL_OB_FT:[id number]=],[NS_DeliveryAssist1_OBGYN_ALL_OB_FT:[id number]=],[NS_DeliveryAssist2_OBGYN_ALL_OB_FT:[id number]=],[NS_CirculateRN2_OBGYN_ALL_OB_FT:[id number]=]

## 2023-02-04 NOTE — OB PROVIDER H&P - LABOR: BISHOP SCORE
CARDIOVASCULAR - ADULT    • Maintains optimal cardiac output and hemodynamic stability Progressing        PAIN - ADULT    • Verbalizes/displays adequate comfort level or patient's stated pain goal Progressing        RESPIRATORY - ADULT    • Achieves optima 8

## 2023-02-04 NOTE — PROCEDURAL SAFETY CHECKLIST WITH OR WITHOUT SEDATION - NSINSTRUMENTCOUNTSD_GEN_ALL_CORE
mom arrives with pt stating he had an episode in the car of shaking and became unresponsive. pt arrive with congestion, remains sleepy. not applicable

## 2023-02-05 DIAGNOSIS — F44.5 CONVERSION DISORDER WITH SEIZURES OR CONVULSIONS: ICD-10-CM

## 2023-02-05 LAB
ALBUMIN SERPL ELPH-MCNC: 2.5 G/DL — LOW (ref 3.5–5.2)
ALP SERPL-CCNC: 189 U/L — HIGH (ref 30–115)
ALT FLD-CCNC: 13 U/L — SIGNIFICANT CHANGE UP (ref 0–41)
ANION GAP SERPL CALC-SCNC: 10 MMOL/L — SIGNIFICANT CHANGE UP (ref 7–14)
AST SERPL-CCNC: 35 U/L — SIGNIFICANT CHANGE UP (ref 0–41)
BASOPHILS # BLD AUTO: 0.04 K/UL — SIGNIFICANT CHANGE UP (ref 0–0.2)
BASOPHILS NFR BLD AUTO: 0.3 % — SIGNIFICANT CHANGE UP (ref 0–1)
BILIRUB SERPL-MCNC: <0.2 MG/DL — SIGNIFICANT CHANGE UP (ref 0.2–1.2)
BILIRUB UR QL STRIP: NORMAL
BUN SERPL-MCNC: 18 MG/DL — SIGNIFICANT CHANGE UP (ref 10–20)
CALCIUM SERPL-MCNC: 7.9 MG/DL — LOW (ref 8.4–10.5)
CHLORIDE SERPL-SCNC: 103 MMOL/L — SIGNIFICANT CHANGE UP (ref 98–110)
CLARITY UR: CLEAR
CO2 SERPL-SCNC: 24 MMOL/L — SIGNIFICANT CHANGE UP (ref 17–32)
COLLECTION METHOD: NORMAL
CREAT SERPL-MCNC: 1 MG/DL — SIGNIFICANT CHANGE UP (ref 0.7–1.5)
EGFR: 80 ML/MIN/1.73M2 — SIGNIFICANT CHANGE UP
EOSINOPHIL # BLD AUTO: 0.07 K/UL — SIGNIFICANT CHANGE UP (ref 0–0.7)
EOSINOPHIL NFR BLD AUTO: 0.4 % — SIGNIFICANT CHANGE UP (ref 0–8)
GLUCOSE SERPL-MCNC: 105 MG/DL — HIGH (ref 70–99)
GLUCOSE UR-MCNC: NORMAL
HCG UR QL: 0.2 EU/DL
HCT VFR BLD CALC: 31.9 % — LOW (ref 37–47)
HGB BLD-MCNC: 11 G/DL — LOW (ref 12–16)
HGB UR QL STRIP.AUTO: NORMAL
IMM GRANULOCYTES NFR BLD AUTO: 0.7 % — HIGH (ref 0.1–0.3)
KETONES UR-MCNC: NORMAL
LDH SERPL L TO P-CCNC: 291 — HIGH (ref 50–242)
LEUKOCYTE ESTERASE UR QL STRIP: NORMAL
LYMPHOCYTES # BLD AUTO: 18.7 % — LOW (ref 20.5–51.1)
LYMPHOCYTES # BLD AUTO: 2.97 K/UL — SIGNIFICANT CHANGE UP (ref 1.2–3.4)
MCHC RBC-ENTMCNC: 30.8 PG — SIGNIFICANT CHANGE UP (ref 27–31)
MCHC RBC-ENTMCNC: 34.5 G/DL — SIGNIFICANT CHANGE UP (ref 32–37)
MCV RBC AUTO: 89.4 FL — SIGNIFICANT CHANGE UP (ref 81–99)
MEV IGG SER-ACNC: 120 AU/ML — SIGNIFICANT CHANGE UP
MEV IGG+IGM SER-IMP: POSITIVE — SIGNIFICANT CHANGE UP
MONOCYTES # BLD AUTO: 0.69 K/UL — HIGH (ref 0.1–0.6)
MONOCYTES NFR BLD AUTO: 4.3 % — SIGNIFICANT CHANGE UP (ref 1.7–9.3)
MUV AB SER-ACNC: POSITIVE — SIGNIFICANT CHANGE UP
MUV IGG FLD-ACNC: 179 AU/ML — SIGNIFICANT CHANGE UP
NEUTROPHILS # BLD AUTO: 11.99 K/UL — HIGH (ref 1.4–6.5)
NEUTROPHILS NFR BLD AUTO: 75.6 % — HIGH (ref 42.2–75.2)
NITRITE UR QL STRIP: NORMAL
NRBC # BLD: 0 /100 WBCS — SIGNIFICANT CHANGE UP (ref 0–0)
PH UR STRIP: 7
PLATELET # BLD AUTO: 115 K/UL — LOW (ref 130–400)
POTASSIUM SERPL-MCNC: 4.4 MMOL/L — SIGNIFICANT CHANGE UP (ref 3.5–5)
POTASSIUM SERPL-SCNC: 4.4 MMOL/L — SIGNIFICANT CHANGE UP (ref 3.5–5)
PROT SERPL-MCNC: 4.9 G/DL — LOW (ref 6–8)
PROT UR STRIP-MCNC: NORMAL
RBC # BLD: 3.57 M/UL — LOW (ref 4.2–5.4)
RBC # FLD: 14.8 % — HIGH (ref 11.5–14.5)
RUBV IGG SER-ACNC: 1 INDEX — SIGNIFICANT CHANGE UP
RUBV IGG SER-IMP: SIGNIFICANT CHANGE UP
SODIUM SERPL-SCNC: 137 MMOL/L — SIGNIFICANT CHANGE UP (ref 135–146)
SP GR UR STRIP: 1.01
WBC # BLD: 15.87 K/UL — HIGH (ref 4.8–10.8)
WBC # FLD AUTO: 15.87 K/UL — HIGH (ref 4.8–10.8)

## 2023-02-05 PROCEDURE — 99231 SBSQ HOSP IP/OBS SF/LOW 25: CPT

## 2023-02-05 RX ORDER — IBUPROFEN 200 MG
600 TABLET ORAL EVERY 6 HOURS
Refills: 0 | Status: DISCONTINUED | OUTPATIENT
Start: 2023-02-05 | End: 2023-02-06

## 2023-02-05 RX ADMIN — Medication 600 MILLIGRAM(S): at 18:23

## 2023-02-05 RX ADMIN — Medication 600 MILLIGRAM(S): at 06:16

## 2023-02-05 RX ADMIN — Medication 600 MILLIGRAM(S): at 06:46

## 2023-02-05 RX ADMIN — Medication 975 MILLIGRAM(S): at 21:45

## 2023-02-05 RX ADMIN — Medication 1 TABLET(S): at 12:44

## 2023-02-05 RX ADMIN — Medication 600 MILLIGRAM(S): at 15:05

## 2023-02-05 RX ADMIN — Medication 975 MILLIGRAM(S): at 15:05

## 2023-02-05 RX ADMIN — Medication 1 SPRAY(S): at 12:44

## 2023-02-05 RX ADMIN — Medication 600 MILLIGRAM(S): at 12:44

## 2023-02-05 RX ADMIN — SODIUM CHLORIDE 3 MILLILITER(S): 9 INJECTION INTRAMUSCULAR; INTRAVENOUS; SUBCUTANEOUS at 22:00

## 2023-02-05 RX ADMIN — Medication 975 MILLIGRAM(S): at 18:25

## 2023-02-05 RX ADMIN — Medication 600 MILLIGRAM(S): at 18:25

## 2023-02-05 RX ADMIN — Medication 975 MILLIGRAM(S): at 09:15

## 2023-02-05 RX ADMIN — Medication 975 MILLIGRAM(S): at 21:11

## 2023-02-05 RX ADMIN — Medication 975 MILLIGRAM(S): at 15:10

## 2023-02-05 RX ADMIN — SODIUM CHLORIDE 3 MILLILITER(S): 9 INJECTION INTRAMUSCULAR; INTRAVENOUS; SUBCUTANEOUS at 06:10

## 2023-02-06 ENCOUNTER — TRANSCRIPTION ENCOUNTER (OUTPATIENT)
Age: 28
End: 2023-02-06

## 2023-02-06 VITALS
DIASTOLIC BLOOD PRESSURE: 57 MMHG | RESPIRATION RATE: 18 BRPM | HEART RATE: 77 BPM | SYSTOLIC BLOOD PRESSURE: 112 MMHG | TEMPERATURE: 98 F

## 2023-02-06 DIAGNOSIS — O42.92 FULL-TERM PREMATURE RUPTURE OF MEMBRANES, UNSPECIFIED AS TO LENGTH OF TIME BETWEEN RUPTURE AND ONSET OF LABOR: ICD-10-CM

## 2023-02-06 LAB
ALBUMIN SERPL ELPH-MCNC: 2.6 G/DL — LOW (ref 3.5–5.2)
ALP SERPL-CCNC: 181 U/L — HIGH (ref 30–115)
ALT FLD-CCNC: 17 U/L — SIGNIFICANT CHANGE UP (ref 0–41)
ANION GAP SERPL CALC-SCNC: 6 MMOL/L — LOW (ref 7–14)
AST SERPL-CCNC: 38 U/L — SIGNIFICANT CHANGE UP (ref 0–41)
BILIRUB SERPL-MCNC: <0.2 MG/DL — SIGNIFICANT CHANGE UP (ref 0.2–1.2)
BUN SERPL-MCNC: 17 MG/DL — SIGNIFICANT CHANGE UP (ref 10–20)
CALCIUM SERPL-MCNC: 8.4 MG/DL — SIGNIFICANT CHANGE UP (ref 8.4–10.4)
CHLORIDE SERPL-SCNC: 106 MMOL/L — SIGNIFICANT CHANGE UP (ref 98–110)
CO2 SERPL-SCNC: 26 MMOL/L — SIGNIFICANT CHANGE UP (ref 17–32)
CREAT SERPL-MCNC: 0.8 MG/DL — SIGNIFICANT CHANGE UP (ref 0.7–1.5)
EGFR: 104 ML/MIN/1.73M2 — SIGNIFICANT CHANGE UP
GLUCOSE SERPL-MCNC: 77 MG/DL — SIGNIFICANT CHANGE UP (ref 70–99)
POTASSIUM SERPL-MCNC: 4.5 MMOL/L — SIGNIFICANT CHANGE UP (ref 3.5–5)
POTASSIUM SERPL-SCNC: 4.5 MMOL/L — SIGNIFICANT CHANGE UP (ref 3.5–5)
PROT SERPL-MCNC: 5.4 G/DL — LOW (ref 6–8)
SODIUM SERPL-SCNC: 138 MMOL/L — SIGNIFICANT CHANGE UP (ref 135–146)

## 2023-02-06 PROCEDURE — 99238 HOSP IP/OBS DSCHRG MGMT 30/<: CPT

## 2023-02-06 RX ORDER — IBUPROFEN 200 MG
1 TABLET ORAL
Qty: 0 | Refills: 0 | DISCHARGE
Start: 2023-02-06

## 2023-02-06 RX ORDER — ACETAMINOPHEN 500 MG
3 TABLET ORAL
Qty: 0 | Refills: 0 | DISCHARGE
Start: 2023-02-06

## 2023-02-06 RX ADMIN — Medication 975 MILLIGRAM(S): at 03:51

## 2023-02-06 RX ADMIN — Medication 975 MILLIGRAM(S): at 04:20

## 2023-02-06 RX ADMIN — SODIUM CHLORIDE 3 MILLILITER(S): 9 INJECTION INTRAMUSCULAR; INTRAVENOUS; SUBCUTANEOUS at 14:37

## 2023-02-06 RX ADMIN — Medication 975 MILLIGRAM(S): at 14:34

## 2023-02-06 RX ADMIN — Medication 1 TABLET(S): at 11:35

## 2023-02-06 RX ADMIN — Medication 975 MILLIGRAM(S): at 08:50

## 2023-02-06 RX ADMIN — Medication 600 MILLIGRAM(S): at 11:35

## 2023-02-06 RX ADMIN — Medication 975 MILLIGRAM(S): at 15:05

## 2023-02-06 RX ADMIN — SODIUM CHLORIDE 3 MILLILITER(S): 9 INJECTION INTRAMUSCULAR; INTRAVENOUS; SUBCUTANEOUS at 06:23

## 2023-02-06 RX ADMIN — Medication 600 MILLIGRAM(S): at 12:06

## 2023-02-06 RX ADMIN — Medication 975 MILLIGRAM(S): at 08:18

## 2023-02-06 NOTE — DISCHARGE NOTE OB - CARE PROVIDER_API CALL
Cristy Fletcher)  OBN  [address] [place] [address]  Phone: [phone]  Fax: [phone]  Follow Up Time:

## 2023-02-06 NOTE — PROGRESS NOTE ADULT - ASSESSMENT
28yo now P1 s/p , PPD1, recovering well    - encourage ambulation  - PO hydration  - Continue diet as tolerated   - Monitor vitals and bleeding  - f/u 1100 [facility]    Dr. Chau and OB attending to be aware.
A/P:  27y , @39w5d, GBS neg, IOL GDMA1   -continue pitocin, currently at 6mu/min  - epidural   -pain management prn  -cont efm/toco  -cont to monitor vitals  -cont iv hydration    Dr. Garcia and Dr. Ugarte aware. 
A/P:  27y , @39w5d, GBS neg, IOL GDMA1   -continue pitocin, currently at 8mu/min  - epidural   -pain management prn  -cont efm/toco  -cont to monitor vitals  -cont iv hydration    Dr. Garcia and Dr. Ugarte aware. 
A/P:  27y , @39w5d, GBS neg, IOL GDMA1, s/p epidural, s/p AROM clear.    -continue pitocin, currently at 8mu/min  - pain management with epidural  - cont efm/toco  - cont to monitor vitals  - cont iv hydration  - instructed on pushing technique, patient to begin pushing    Dr. Ugarte to be made aware. 
A/P:  27y , @39w5d, GBS neg, IOL GDMA1   -continue pitocin, currently at 6mu/min   - epidural   -pain management prn  -cont efm/toco  -cont to monitor vitals  -cont iv hydration    Dr. Garcia and Dr. Ugarte aware. 
A/P: 27y P1 S/P  PPD2, GDMA1, Preeclampsia without severe features, recovering well   - pain management with PO pain meds   - monitor vitals/bleeding   - ambulation/PO hydration  - regular diet as tolerated   - VNS for home BP monitoring.   - routine postpartum care; anticipate discharge today    Dr. Garcia and OB attending to be made aware.

## 2023-02-06 NOTE — DISCHARGE NOTE OB - SWOLLEN AREA ON THE LEG THAT IS PAINFUL, RED OR HOT
Statement Selected
Anus position normal and patency confirmed, rectal-cutaneous fistula absent, normal anal wink.

## 2023-02-06 NOTE — DISCHARGE NOTE OB - CARE PLAN
Principal Discharge DX:	Normal vaginal delivery  Assessment and plan of treatment:	Nothing inside the vagina for 6 weeks: no tampons, douching, sexual intercourse, tub baths or pools.   If you have a fever over 100.4F, severe abdominal pain or heavy vaginal bleeding please call your doctor or go to the emergency room. Postpartum visit in 6 weeks.  Secondary Diagnosis:	Preeclampsia  Assessment and plan of treatment:	If you have headache, blurry vision, chest pain, difficulty breathing, or severe abdominal pain, call the doctor or return to the hospital. Follow up with your OBGYN in 1 week for a blood pressure check.  Secondary Diagnosis:	Gestational diabetes  Assessment and plan of treatment:	Glucose tolerance test in 4-6 weeks.   1

## 2023-02-06 NOTE — PROGRESS NOTE ADULT - SUBJECTIVE AND OBJECTIVE BOX
PGY1 Note: Vaginal Delivery    Pt seen and evaluated at bedside. Pain well controlled. Vaginal bleeding minimal. Denies dizziness/lightheadedness/CP/SOB/palpitations. Denies fever, chills, nausea/vomiting, diarrhea, dysuria, LE pain.     Ambulating: Yes  Voiding: Yes  Breast or bottle feeding: breast  Diet: tolerating regular diet     Physical Exam  Vital Signs Last 24 Hrs  T(C): 36.5 (06 Feb 2023 03:30), Max: 36.9 (05 Feb 2023 15:48)  T(F): 97.7 (06 Feb 2023 03:30), Max: 98.4 (05 Feb 2023 15:48)  HR: 76 (06 Feb 2023 03:30) (75 - 88)  BP: 125/72 (06 Feb 2023 03:30) (105/60 - 125/72)  RR: 16 (06 Feb 2023 03:30) (16 - 18)    Gen: AAOx3, NAD  Fundus: firm, below umbilicus   Abd: Soft, nontender, nondistended  Lochia: minimal   Ext: No calf tenderness, no swelling    Labs:                        11.0   15.87 )-----------( 115      ( 05 Feb 2023 17:28 )             31.9                         13.0   12.95 )-----------( 125      ( 04 Feb 2023 14:59 )             37.3    02-05    137  |  103  |  18  ----------------------------<  105<H>  4.4   |  24  |  1.0    Ca    7.9<L>      05 Feb 2023 17:28    TPro  4.9<L>  /  Alb  2.5<L>  /  TBili  <0.2  /  DBili  x   /  AST  35  /  ALT  13  /  AlkPhos  189<H>  02-05      MEDICATIONS  (STANDING):  acetaminophen     Tablet .. 975 milliGRAM(s) Oral <User Schedule>  diphtheria/tetanus/pertussis (acellular) Vaccine (Adacel) 0.5 milliLiter(s) IntraMuscular once  ibuprofen  Tablet. 600 milliGRAM(s) Oral every 6 hours  oxytocin Infusion 41.667 milliUNIT(s)/Min (125 mL/Hr) IV Continuous <Continuous>  prenatal multivitamin 1 Tablet(s) Oral daily  sodium chloride 0.9% lock flush 3 milliLiter(s) IV Push every 8 hours    MEDICATIONS  (PRN):  benzocaine 20%/menthol 0.5% Spray 1 Spray(s) Topical every 6 hours PRN for Perineal discomfort  dibucaine 1% Ointment 1 Application(s) Topical every 6 hours PRN Perineal discomfort  diphenhydrAMINE 25 milliGRAM(s) Oral every 6 hours PRN Pruritus  hydrocortisone 1% Cream 1 Application(s) Topical every 6 hours PRN Moderate Pain (4-6)  lanolin Ointment 1 Application(s) Topical every 6 hours PRN nipple soreness  magnesium hydroxide Suspension 30 milliLiter(s) Oral two times a day PRN Constipation  oxyCODONE    IR 5 milliGRAM(s) Oral every 3 hours PRN Moderate to Severe Pain (4-10)  oxyCODONE    IR 5 milliGRAM(s) Oral once PRN Moderate to Severe Pain (4-10)  pramoxine 1%/zinc 5% Cream 1 Application(s) Topical every 4 hours PRN Moderate Pain (4-6)  simethicone 80 milliGRAM(s) Chew every 4 hours PRN Gas  witch hazel Pads 1 Application(s) Topical every 4 hours PRN Perineal discomfort    
PGY 1 Note    Patient seen at bedside for evaluation of labor progression, comfortable s/p epidural.   - on pitocin 8mu/min     T(F): 98.2 (23 @ 13:06), Max: 98.4 (23 @ 12:20)  HR: 94 (23 @ 15:15) (78 - 100)  BP: 142/91 (23 @ 15:15) (121/82 - 153/88)  RR: 16 (23 @ 13:06) (16 - 20)    EFM: 145/mod variability/+ accels/cat 1  TOCO: q1-2min  SVE: 6/80/-2 AROM clear @20:20     Medications:  epidural @1550  lactated ringers.: 125 mL/Hr (23 @ 12:49)  oxytocin Infusion.: 2 mL/Hr (23 @ 13:17)      Labs:                        13.0   12.95 )-----------( 125      ( 2023 14:59 )             37.3           ABO RH Interpretation: O POS (23 @ 14:00)    Antibody Screen: NEG (23 @ 14:00)    Urinalysis Basic - ( 2023 14:00 )    Color: Yellow / Appearance: Turbid / S.030 / pH: x  Gluc: x / Ketone: Negative  / Bili: Negative / Urobili: <2 mg/dL   Blood: x / Protein: 300 mg/dL / Nitrite: Negative   Leuk Esterase: Small / RBC: 38 /HPF / WBC 78 /HPF   Sq Epi: x / Non Sq Epi: >27 /HPF / Bacteria: Few        Prenatal Syphilis Test: Nonreact (23 @ 14:00)                
PGY 4 Note    Patient seen at bedside for evaluation of labor progression.  Comfortable s/p epidural. Feel s some pressure with contractions.    Vital Signs Last 24 Hrs  T(C): 36.9 (2023 21:03), Max: 37.0 (2023 17:59)  T(F): 98.42 (2023 21:03), Max: 98.6 (2023 17:59)  HR: 104 (2023 22:21) (74 - 111)  BP: 124/64 (2023 22:12) (108/67 - 153/88)  RR: 16 (2023 15:30) (16 - 20)  SpO2: 96% (2023 22:27) (95% - 100%)        EFM: 150/mod/+accel, cat I  TOCO: q2m  SVE: 10/100/+1 @2213    Labs:                        13.0   12.95 )-----------( 125      ( 2023 14:59 )             37.3           ABO RH Interpretation: O POS (23 @ 14:00)    Urinalysis Basic - ( 2023 14:00 )    Color: Yellow / Appearance: Turbid / S.030 / pH: x  Gluc: x / Ketone: Negative  / Bili: Negative / Urobili: <2 mg/dL   Blood: x / Protein: 300 mg/dL / Nitrite: Negative   Leuk Esterase: Small / RBC: 38 /HPF / WBC 78 /HPF   Sq Epi: x / Non Sq Epi: >27 /HPF / Bacteria: Few    UDS neg  RPR NR  COVID-19 neg        Meds: chlorhexidine 2% Cloths 1 Application(s) Topical once  citric acid/sodium citrate Solution 15 milliLiter(s) Oral every 6 hours  fentanyl (2 MICROgram(s)/mL) + bupivacaine 0.0625%  in 0.9% Sodium Chloride PCEA 250 milliLiter(s) Epidural PCA Continuous, started @1550  lactated ringers. 1000 milliLiter(s) IV Continuous <Continuous>  oxytocin Infusion 333.333 milliUNIT(s)/Min IV Continuous <Continuous>  oxytocin Infusion. 2 milliUNIT(s)/Min IV Continuous <Continuous>, started @1400, now at 8mu/min      
PGY1 Note    Patient seen and examined. Pain well controlled at this time. No complaints at this time. Denies fever, chills, nausea, vomiting, chest pain, shortness of breath, severe abdominal pain, heavy vaginal bleeding, headache, scotomata, and RUQ pain.    Patient is ambulating.   Passing flatus.   Tolerating regular diet   Voiding without difficulty, no dysuria     MEDICATIONS  (STANDING):  acetaminophen     Tablet .. 975 milliGRAM(s) Oral <User Schedule>  diphtheria/tetanus/pertussis (acellular) Vaccine (Adacel) 0.5 milliLiter(s) IntraMuscular once  ibuprofen  Tablet. 600 milliGRAM(s) Oral every 6 hours  oxytocin Infusion 41.667 milliUNIT(s)/Min (125 mL/Hr) IV Continuous <Continuous>  prenatal multivitamin 1 Tablet(s) Oral daily  sodium chloride 0.9% lock flush 3 milliLiter(s) IV Push every 8 hours    Physical Exam  Vital Signs Last 24 Hrs  T(C): 37 (05 Feb 2023 01:25), Max: 37.0 (04 Feb 2023 17:59)  T(F): 98.6 (05 Feb 2023 01:25), Max: 98.6 (04 Feb 2023 17:59)  HR: 78 (05 Feb 2023 01:25) (54 - 150)  BP: 107/64 (05 Feb 2023 01:25) (107/64 - 153/88)  RR: 18 (05 Feb 2023 01:25) (16 - 20)  SpO2: 86% (05 Feb 2023 00:55) (81% - 100%)    Gen: AAOx3, NAD  Cardio: RRR, S1S2, no M/R/G  Resp: CTAB  Ext: No calf tenderness, no swelling  Fundus: firm, below umbilicus. Nontender.   Abd: Soft, nontender, nondistended, BS+  Lochia: minimal       Labs:                        13.0   12.95 )-----------( 125      ( 04 Feb 2023 14:59 )             37.3      
PGY 1 Note    Patient seen at bedside for evaluation of labor progression, comfortable s/p epidural.   - on pitocin 6mu/min     T(F): 98.2 (23 @ 13:06), Max: 98.4 (23 @ 12:20)  HR: 94 (23 @ 15:15) (78 - 100)  BP: 142/91 (23 @ 15:15) (121/82 - 153/88)  RR: 16 (23 @ 13:06) (16 - 20)    EFM: 145/mod variability/+ accels/cat 1  TOCO: q1-2min  SVE: 4/80/-2 @1230     Medications:  epidural @1550  lactated ringers.: 125 mL/Hr (23 @ 12:49)  oxytocin Infusion.: 2 mL/Hr (23 @ 13:17)      Labs:                        13.0   12.95 )-----------( 125      ( 2023 14:59 )             37.3           ABO RH Interpretation: O POS (23 @ 14:00)    Antibody Screen: NEG (23 @ 14:00)    Urinalysis Basic - ( 2023 14:00 )    Color: Yellow / Appearance: Turbid / S.030 / pH: x  Gluc: x / Ketone: Negative  / Bili: Negative / Urobili: <2 mg/dL   Blood: x / Protein: 300 mg/dL / Nitrite: Negative   Leuk Esterase: Small / RBC: 38 /HPF / WBC 78 /HPF   Sq Epi: x / Non Sq Epi: >27 /HPF / Bacteria: Few        Prenatal Syphilis Test: Nonreact (23 @ 14:00)                
PGY 1 Note    Patient seen at bedside for evaluation of labor progression, requesting epidural.   - on pitocin 6mu/min     T(F): 98.2 (23 @ 13:06), Max: 98.4 (23 @ 12:20)  HR: 94 (23 @ 15:15) (78 - 100)  BP: 142/91 (23 @ 15:15) (121/82 - 153/88)  RR: 16 (23 @ 13:06) (16 - 20)    EFM: 145/mod variability/+ accels/cat 1  TOCO: q1-4min  SVE: deferred, 1.5/80/-2 @1230     Medications:  (ADM OVERRIDE): 1 Each (23 @ 13:50)  lactated ringers.: 125 mL/Hr (23 @ 12:49)  oxytocin Infusion.: 2 mL/Hr (23 @ 13:17)      Labs:                        13.0   12.95 )-----------( 125      ( 2023 14:59 )             37.3           ABO RH Interpretation: O POS (23 @ 14:00)    Antibody Screen: NEG (23 @ 14:00)    Urinalysis Basic - ( 2023 14:00 )    Color: Yellow / Appearance: Turbid / S.030 / pH: x  Gluc: x / Ketone: Negative  / Bili: Negative / Urobili: <2 mg/dL   Blood: x / Protein: 300 mg/dL / Nitrite: Negative   Leuk Esterase: Small / RBC: 38 /HPF / WBC 78 /HPF   Sq Epi: x / Non Sq Epi: >27 /HPF / Bacteria: Few        Prenatal Syphilis Test: Nonreact (23 @ 14:00)

## 2023-02-06 NOTE — DISCHARGE NOTE OB - PLAN OF CARE
Glucose tolerance test in 4-6 weeks. Nothing inside the vagina for 6 weeks: no tampons, douching, sexual intercourse, tub baths or pools.   If you have a fever over 100.4F, severe abdominal pain or heavy vaginal bleeding please call your doctor or go to the emergency room. Postpartum visit in 6 weeks. If you have headache, blurry vision, chest pain, difficulty breathing, or severe abdominal pain, call the doctor or return to the hospital. Follow up with your OBGYN in 1 week for a blood pressure check.

## 2023-02-06 NOTE — PROGRESS NOTE ADULT - ATTENDING COMMENTS
26 y/o PPD#2 s/p , doing well. Follow up labs. Routine postpartum care.
PPD 2 s/p  with hx c/gdma1 and preeclampsia without severe features. Pt stable for d/c. Home bp monitoring arranged. Routine post partum care.

## 2023-02-06 NOTE — DISCHARGE NOTE OB - HOSPITAL COURSE
Patient admitted in labor, met criteria for preeclampsia without severe features, underwent uncomplicated vaginal delivery and had an uncomplicated postpartum course, discharged in stable condition.

## 2023-02-06 NOTE — DISCHARGE NOTE OB - PATIENT PORTAL LINK FT
You can access the FollowMyHealth Patient Portal offered by [facility] by registering at the following website: [email or web address]. By joining [employer]’s FollowMyHealth portal, you will also be able to view your health information using other applications (apps) compatible with our system.

## 2023-02-13 DIAGNOSIS — Z3A.39 39 WEEKS GESTATION OF PREGNANCY: ICD-10-CM

## 2023-02-27 LAB
BILIRUB UR QL STRIP: NORMAL
BP DIAS: 60 MM HG
BP SYS: 125 MM HG
CLARITY UR: CLEAR
COLLECTION METHOD: NORMAL
FETAL MOVEMENT: PRESENT
GLUCOSE BLDC GLUCOMTR-MCNC: 106
GLUCOSE UR-MCNC: NORMAL
HCG UR QL: 0.2 EU/DL
HGB UR QL STRIP.AUTO: NORMAL
KETONES UR-MCNC: NORMAL
LEUKOCYTE ESTERASE UR QL STRIP: NORMAL
NITRITE UR QL STRIP: NORMAL
OB COMMENTS: NORMAL
PH UR STRIP: 6.5
PROT UR STRIP-MCNC: NORMAL
SCHEDULED VISIT: YES
SP GR UR STRIP: 1
URINE ALBUMIN/PROTEIN: NORMAL
URINE GLUCOSE: NORMAL
URINE KETONES: NORMAL
WEEKS GESTATION: 33.1

## 2023-04-07 ENCOUNTER — NON-APPOINTMENT (OUTPATIENT)
Age: 28
End: 2023-04-07

## 2023-04-07 ENCOUNTER — OUTPATIENT (OUTPATIENT)
Dept: OUTPATIENT SERVICES | Facility: HOSPITAL | Age: 28
LOS: 1 days | End: 2023-04-07
Payer: MEDICAID

## 2023-04-07 ENCOUNTER — APPOINTMENT (OUTPATIENT)
Dept: OBGYN | Facility: CLINIC | Age: 28
End: 2023-04-07
Payer: MEDICAID

## 2023-04-07 VITALS — BODY MASS INDEX: 25.25 KG/M2 | SYSTOLIC BLOOD PRESSURE: 100 MMHG | WEIGHT: 125 LBS | DIASTOLIC BLOOD PRESSURE: 60 MMHG

## 2023-04-07 PROCEDURE — 99214 OFFICE O/P EST MOD 30 MIN: CPT

## 2023-05-09 NOTE — OB RN PATIENT PROFILE - NSTOBACCONEVERSMOKERY/N_GEN_A
Assessment/Plan:    Diagnoses and all orders for this visit:    Closed fracture of distal ends of left radius and ulna, initial encounter  -     XR wrist 3+ vw left; Future  -     Cast application    Left wrist pain  -     Cast application    NWB Left UE    Return in about 5 weeks (around 6/13/2023) for cast off Xray left wrist       Subjective:   Patient ID: Terence Eric is a 81 y o  male  [facility] [address] [facility] fracture of the left wrist   It is believed that he fell out of bed and x-ray was obtained May 7 and those images have been reviewed today showing a distal radius and ulna fracture  There is some impaction and minimal angulation  There is involvement into the joint space however there is no displacement or step-off  Review of Systems    The following portions of the patient's chart were reviewed and updated as appropriate:    Allergy:    Allergies   Allergen Reactions   • Aricept [Donepezil] Confusion     confusion   • Atorvastatin Myalgia, Dizziness and Headache   • Niacin Other (See Comments)     unknown       Medications:    Current Outpatient Medications:   •  acetaminophen (TYLENOL) 325 mg tablet, Take 3 tablets (975 mg total) by mouth every 8 (eight) hours, Disp: 120 tablet, Rfl: 0  •  clopidogrel (PLAVIX) 75 mg tablet, TAKE ONE TABLET BY MOUTH EVERY DAY, Disp: 100 tablet, Rfl: 3  •  lidocaine (LIDODERM) 5 %, Apply 1 patch topically over 12 hours daily Remove & Discard patch within 12 hours or as directed by MD, Disp: 14 patch, Rfl: 0  •  melatonin 3 mg, Take 1 tablet (3 mg total) by mouth daily at bedtime, Disp: , Rfl: 0  •  memantine (NAMENDA) 5 mg tablet, Take 5 mg by mouth daily at bedtime, Disp: , Rfl:   •  metoprolol succinate (TOPROL-XL) 25 mg 24 hr tablet, TAKE ONE TABLET BY MOUTH EVERY DAY, Disp: 90 tablet, Rfl: 3  •  pantoprazole (PROTONIX) 40 mg tablet, Take 1 tablet (40 mg total) by mouth daily in the early morning Do not start before March 17, 2023 , Disp: , Rfl: 0  •  psyllium (METAMUCIL) packet, Take 1 packet by mouth daily Do not start before March 17, 2023 , Disp: , Rfl: 0  •  sertraline (ZOLOFT) 25 mg tablet, Take 3 tablets (75 mg total) by mouth daily Do not start before March 17, 2023 , Disp: , Rfl: 0  •  Stool Softener 100 MG capsule, TAKE ONE CAPSULE BY MOUTH TWO TIMES A DAY *PATIENT SUPPLY, Disp: 60 capsule, Rfl: 5  •  clopidogrel (PLAVIX) 75 mg tablet, Take 1 tablet (75 mg total) by mouth daily Do not start before March 17, 2023  (Patient not taking: Reported on 3/30/2023), Disp: , Rfl: 0  •  enoxaparin (Lovenox) 40 mg/0 4 mL, Inject 0 4 mL (40 mg total) under the skin in the morning for 28 days, Disp: 11 2 mL, Rfl: 0  •  Melatonin 5 MG CAPS, Take 5 mg by mouth daily at bedtime (Patient not taking: Reported on 3/30/2023), Disp: , Rfl:   •  Melatonin 5 MG TABS, TAKE ONE TABLET BY MOUTH EVERY NIGHT AT BEDTIME *PATIENT SUPPLY (Patient not taking: Reported on 3/30/2023), Disp: 30 tablet, Rfl: 5  •  memantine (NAMENDA) 5 mg tablet, Take 1 tablet (5 mg total) by mouth daily at bedtime (Patient not taking: Reported on 3/30/2023), Disp: , Rfl: 0  •  Metamucil Fiber 51 7 % PACK, MIX 1 PACKET IN 6-8 OUNCES OF WATER AND CONSUME BY MOUTH DAILY *PATIENT SUPPLY (Patient not taking: Reported on 3/30/2023), Disp: 44 each, Rfl: 5  •  metoprolol succinate (TOPROL-XL) 25 mg 24 hr tablet, Take 1 tablet (25 mg total) by mouth daily Do not start before March 17, 2023   (Patient not taking: Reported on 3/30/2023), Disp: , Rfl: 0  •  midodrine (PROAMATINE) 5 mg tablet, TAKE ONE TABLET BY MOUTH EVERY DAY (Patient not taking: Reported on 3/30/2023), Disp: 90 tablet, Rfl: 3  •  omeprazole (PriLOSEC) 40 MG capsule, TAKE ONE CAPSULE BY MOUTH EVERY DAY (Patient not taking: Reported on 3/30/2023), Disp: 100 capsule, Rfl: 3  •  psyllium (METAMUCIL) 58 6 % packet, Take 1 packet by mouth daily (Patient not taking: Reported on 3/30/2023), Disp: , Rfl:   • "sertraline (ZOLOFT) 50 mg tablet, TAKE ONE AND ONE-HALF TABLETS BY MOUTH EVERY DAY (Patient not taking: Reported on 3/30/2023), Disp: 135 tablet, Rfl: 3  •  simvastatin (ZOCOR) 80 mg tablet, TAKE ONE TABLET BY MOUTH EVERY DAY (Patient not taking: Reported on 3/30/2023), Disp: 90 tablet, Rfl: 3    Patient Active Problem List   Diagnosis   • Constipation   • Iron deficiency   • Other constipation   • Anemia   • Arteriosclerotic cardiovascular disease   • Backache   • Essential hypertension   • Cervical spinal stenosis   • Depression   • Esophageal reflux   • Hyperlipidemia   • Insomnia   • Spinal stenosis of lumbar region with neurogenic claudication   • Vitamin B12 deficiency   • Idiopathic peripheral neuropathy   • Iron deficiency anemia   • History of meningioma   • Cognitive decline   • Contusion of rib on right side   • Orthostatic hypotension   • Convulsions (HCC)   • Lumbar spondylosis   • Lumbar radiculopathy   • Acute CVA (cerebrovascular accident), suspected embolic in nature   • Hypercalcemia   • History of resection of meningioma   • History of stroke   • Depression   • Bilateral paralysis as late effect of cerebrovascular accident (CVA) ([address] )   • Calcification of aorta ([address] )   • Need for influenza vaccination   • Dementia without behavioral disturbance ([address] )   • Qualitative platelet defects ([address] )   • Dysphagia   • Candida esophagitis (HCC)   • Failure to thrive in adult   • Silent aspiration   • Moderate protein-calorie malnutrition ([address] )   • Fall   • Multiple fractures of pelvis ([address] )   • Fall   • Ambulatory dysfunction   • Elevated troponin   • History of CVA (cerebrovascular accident)   • Dementia ([address] )   • Primary hypertension   • Moderate protein-calorie malnutrition ([address] )   • Dysphagia       Objective:  Ht 5' 9\" (1 753 m)   Wt 64 4 kg (142 lb)   BMI 20 97 kg/m²     Right Hand Exam     Other   Erythema: absent  Sensation: normal  Pulse: present    Comments:   There is swelling and bruising of the " distal forearm and wrist   There is tenderness to palpation of the wrist diffusely  Patient is neurovascularly intact distally  Decreased painful AROM            Physical Exam      Neurologic Exam    Cast application    Date/Time: 5/9/2023 2:15 PM  Performed by: Elmer Danielle MD  Authorized by: Elmer Danielle MD   Universal Protocol:  Consent: Verbal consent obtained  Risks and benefits: risks, benefits and alternatives were discussed  Consent given by: patient  Timeout called at: 5/9/2023 4:28 PM   Patient understanding: patient states understanding of the procedure being performed  Radiology Images displayed and confirmed  If images not available, report reviewed: imaging studies available  Patient identity confirmed: verbally with patient      Pre-procedure details:     Sensation:  Normal    Skin color:  Normal healthy with bruising  Procedure details:     Laterality:  Left    Location:  Wrist    Wrist:  L wristCast type:  Short arm      Supplies:  Fiberglass, cotton padding and skin protective strip  Post-procedure details:     Pain:  Improved    Sensation:  Normal    Skin color:  Pink    Patient tolerance of procedure: Tolerated well, no immediate complications        I have personally reviewed pertinent films in PACS and my interpretation is   distal radius and ulna fracture  There is some impaction and minimal angulation  There is involvement into the joint space however there is no displacement or step-off                 Past Medical History:   Diagnosis Date   • Anxiety    • Arthritis    • Coronary artery disease    • Coronary artery disease involving native coronary artery of native heart without angina pectoris    • Depression    • Fracture    • Hypercholesterolemia    • Meningioma ([facility] ) 11/1999    brain tumor   • Meningioma (HCC)    • Narcolepsy     daytime drowsiness and dozing off occasionally   • Orthostatic hypotension    • Palpitations    • Prostate CA [facility])    • Prostate cancer ([facility] )    • Stroke [facility])        Past Surgical History:   Procedure Laterality Date   • BACK SURGERY     • BRAIN SURGERY  11/08/1999   • BRAIN SURGERY     • CARDIAC SURGERY     • CORONARY ARTERY BYPASS GRAFT      x5   • CORONARY ARTERY BYPASS GRAFT         Social History     Socioeconomic History   • Marital status: /Civil Union     Spouse name: Not on file   • Number of children: Not on file   • Years of education: Not on file   • Highest education level: Not on file   Occupational History   • Occupation: retired   Tobacco Use   • Smoking status: Never   • Smokeless tobacco: Never   Vaping Use   • Vaping Use: Never used   Substance and Sexual Activity   • Alcohol use: Not Currently     Comment: (history)   • Drug use: No   • Sexual activity: Not Currently   Other Topics Concern   • Not on file   Social History Narrative    ** Merged History Encounter **         ** Merged History Encounter **         ** Merged History Encounter **         Pt lives with wife     Social Determinants of Health     Financial Resource Strain: Not on file   Food Insecurity: No Food Insecurity   • Worried About Running Out of Food in the Last Year: Never true   • Ran Out of Food in the Last Year: Never true   Transportation Needs: No Transportation Needs   • Lack of Transportation (Medical): No   • Lack of Transportation (Non-Medical):  No   Physical Activity: Not on file   Stress: Not on file   Social Connections: Not on file   Intimate Partner Violence: Not on file   Housing Stability: Low Risk    • Unable to Pay for Housing in the Last Year: No   • Number of Places Lived in the Last Year: 1   • Unstable Housing in the Last Year: No       Family History   Problem Relation Age of Onset   • Hypertension Mother         benign essential   • Cancer Mother    • Osteoporosis Mother    • Suicidality Father    • Diabetes Family    • Heart disease Family    • Neuropathy Family         peripheral   • Prostate cancer Family    • Thyroid disease Family No

## 2023-07-31 ENCOUNTER — EMERGENCY (EMERGENCY)
Facility: HOSPITAL | Age: 28
LOS: 0 days | Discharge: HOME | End: 2023-07-31
Attending: EMERGENCY MEDICINE
Payer: MEDICAID

## 2023-07-31 VITALS
SYSTOLIC BLOOD PRESSURE: 115 MMHG | HEART RATE: 77 BPM | OXYGEN SATURATION: 99 % | RESPIRATION RATE: 18 BRPM | DIASTOLIC BLOOD PRESSURE: 66 MMHG | TEMPERATURE: 98 F

## 2023-07-31 VITALS
WEIGHT: 130.07 LBS | HEART RATE: 85 BPM | HEIGHT: 59 IN | SYSTOLIC BLOOD PRESSURE: 124 MMHG | DIASTOLIC BLOOD PRESSURE: 81 MMHG | OXYGEN SATURATION: 99 % | TEMPERATURE: 98 F | RESPIRATION RATE: 18 BRPM

## 2023-07-31 DIAGNOSIS — R10.32 LEFT LOWER QUADRANT PAIN: ICD-10-CM

## 2023-07-31 DIAGNOSIS — Z91.013 ALLERGY TO SEAFOOD: ICD-10-CM

## 2023-07-31 DIAGNOSIS — R10.30 LOWER ABDOMINAL PAIN, UNSPECIFIED: ICD-10-CM

## 2023-07-31 LAB
ALBUMIN SERPL ELPH-MCNC: 4.4 G/DL — SIGNIFICANT CHANGE UP (ref 3.5–5.2)
ALP SERPL-CCNC: 116 U/L — HIGH (ref 30–115)
ALT FLD-CCNC: 12 U/L — SIGNIFICANT CHANGE UP (ref 0–41)
ANION GAP SERPL CALC-SCNC: 11 MMOL/L — SIGNIFICANT CHANGE UP (ref 7–14)
APPEARANCE UR: CLEAR — SIGNIFICANT CHANGE UP
AST SERPL-CCNC: 16 U/L — SIGNIFICANT CHANGE UP (ref 0–41)
BACTERIA # UR AUTO: NEGATIVE — SIGNIFICANT CHANGE UP
BASOPHILS # BLD AUTO: 0.05 K/UL — SIGNIFICANT CHANGE UP (ref 0–0.2)
BASOPHILS NFR BLD AUTO: 0.5 % — SIGNIFICANT CHANGE UP (ref 0–1)
BILIRUB DIRECT SERPL-MCNC: <0.2 MG/DL — SIGNIFICANT CHANGE UP (ref 0–0.3)
BILIRUB INDIRECT FLD-MCNC: SIGNIFICANT CHANGE UP MG/DL (ref 0.2–1.2)
BILIRUB SERPL-MCNC: <0.2 MG/DL — SIGNIFICANT CHANGE UP (ref 0.2–1.2)
BILIRUB UR-MCNC: NEGATIVE — SIGNIFICANT CHANGE UP
BUN SERPL-MCNC: 12 MG/DL — SIGNIFICANT CHANGE UP (ref 10–20)
CALCIUM SERPL-MCNC: 9.5 MG/DL — SIGNIFICANT CHANGE UP (ref 8.4–10.5)
CHLORIDE SERPL-SCNC: 102 MMOL/L — SIGNIFICANT CHANGE UP (ref 98–110)
CO2 SERPL-SCNC: 27 MMOL/L — SIGNIFICANT CHANGE UP (ref 17–32)
COLOR SPEC: SIGNIFICANT CHANGE UP
CREAT SERPL-MCNC: 0.6 MG/DL — LOW (ref 0.7–1.5)
DIFF PNL FLD: NEGATIVE — SIGNIFICANT CHANGE UP
EGFR: 126 ML/MIN/1.73M2 — SIGNIFICANT CHANGE UP
EOSINOPHIL # BLD AUTO: 0.25 K/UL — SIGNIFICANT CHANGE UP (ref 0–0.7)
EOSINOPHIL NFR BLD AUTO: 2.6 % — SIGNIFICANT CHANGE UP (ref 0–8)
EPI CELLS # UR: 3 /HPF — SIGNIFICANT CHANGE UP (ref 0–5)
GLUCOSE SERPL-MCNC: 120 MG/DL — HIGH (ref 70–99)
GLUCOSE UR QL: NEGATIVE — SIGNIFICANT CHANGE UP
HCT VFR BLD CALC: 35.2 % — LOW (ref 37–47)
HGB BLD-MCNC: 12.3 G/DL — SIGNIFICANT CHANGE UP (ref 12–16)
HYALINE CASTS # UR AUTO: 1 /LPF — SIGNIFICANT CHANGE UP (ref 0–7)
IMM GRANULOCYTES NFR BLD AUTO: 0.3 % — SIGNIFICANT CHANGE UP (ref 0.1–0.3)
KETONES UR-MCNC: NEGATIVE — SIGNIFICANT CHANGE UP
LACTATE SERPL-SCNC: 1.1 MMOL/L — SIGNIFICANT CHANGE UP (ref 0.7–2)
LEUKOCYTE ESTERASE UR-ACNC: NEGATIVE — SIGNIFICANT CHANGE UP
LIDOCAIN IGE QN: 29 U/L — SIGNIFICANT CHANGE UP (ref 7–60)
LYMPHOCYTES # BLD AUTO: 3.03 K/UL — SIGNIFICANT CHANGE UP (ref 1.2–3.4)
LYMPHOCYTES # BLD AUTO: 31.3 % — SIGNIFICANT CHANGE UP (ref 20.5–51.1)
MCHC RBC-ENTMCNC: 30.4 PG — SIGNIFICANT CHANGE UP (ref 27–31)
MCHC RBC-ENTMCNC: 34.9 G/DL — SIGNIFICANT CHANGE UP (ref 32–37)
MCV RBC AUTO: 86.9 FL — SIGNIFICANT CHANGE UP (ref 81–99)
MONOCYTES # BLD AUTO: 0.65 K/UL — HIGH (ref 0.1–0.6)
MONOCYTES NFR BLD AUTO: 6.7 % — SIGNIFICANT CHANGE UP (ref 1.7–9.3)
NEUTROPHILS # BLD AUTO: 5.66 K/UL — SIGNIFICANT CHANGE UP (ref 1.4–6.5)
NEUTROPHILS NFR BLD AUTO: 58.6 % — SIGNIFICANT CHANGE UP (ref 42.2–75.2)
NITRITE UR-MCNC: NEGATIVE — SIGNIFICANT CHANGE UP
NRBC # BLD: 0 /100 WBCS — SIGNIFICANT CHANGE UP (ref 0–0)
PH UR: 6.5 — SIGNIFICANT CHANGE UP (ref 5–8)
PLATELET # BLD AUTO: 227 K/UL — SIGNIFICANT CHANGE UP (ref 130–400)
PMV BLD: 11.1 FL — HIGH (ref 7.4–10.4)
POTASSIUM SERPL-MCNC: 3.9 MMOL/L — SIGNIFICANT CHANGE UP (ref 3.5–5)
POTASSIUM SERPL-SCNC: 3.9 MMOL/L — SIGNIFICANT CHANGE UP (ref 3.5–5)
PROT SERPL-MCNC: 7.2 G/DL — SIGNIFICANT CHANGE UP (ref 6–8)
PROT UR-MCNC: ABNORMAL
RBC # BLD: 4.05 M/UL — LOW (ref 4.2–5.4)
RBC # FLD: 11.9 % — SIGNIFICANT CHANGE UP (ref 11.5–14.5)
RBC CASTS # UR COMP ASSIST: 4 /HPF — SIGNIFICANT CHANGE UP (ref 0–4)
SODIUM SERPL-SCNC: 140 MMOL/L — SIGNIFICANT CHANGE UP (ref 135–146)
SP GR SPEC: 1.02 — SIGNIFICANT CHANGE UP (ref 1.01–1.03)
UROBILINOGEN FLD QL: SIGNIFICANT CHANGE UP
WBC # BLD: 9.67 K/UL — SIGNIFICANT CHANGE UP (ref 4.8–10.8)
WBC # FLD AUTO: 9.67 K/UL — SIGNIFICANT CHANGE UP (ref 4.8–10.8)
WBC UR QL: 0 /HPF — SIGNIFICANT CHANGE UP (ref 0–5)

## 2023-07-31 PROCEDURE — 80048 BASIC METABOLIC PNL TOTAL CA: CPT

## 2023-07-31 PROCEDURE — 85025 COMPLETE CBC W/AUTO DIFF WBC: CPT

## 2023-07-31 PROCEDURE — 93976 VASCULAR STUDY: CPT

## 2023-07-31 PROCEDURE — 99284 EMERGENCY DEPT VISIT MOD MDM: CPT | Mod: 25

## 2023-07-31 PROCEDURE — 76830 TRANSVAGINAL US NON-OB: CPT

## 2023-07-31 PROCEDURE — 80076 HEPATIC FUNCTION PANEL: CPT

## 2023-07-31 PROCEDURE — 81001 URINALYSIS AUTO W/SCOPE: CPT

## 2023-07-31 PROCEDURE — 76830 TRANSVAGINAL US NON-OB: CPT | Mod: 26

## 2023-07-31 PROCEDURE — 83690 ASSAY OF LIPASE: CPT

## 2023-07-31 PROCEDURE — 74177 CT ABD & PELVIS W/CONTRAST: CPT | Mod: MA

## 2023-07-31 PROCEDURE — 74177 CT ABD & PELVIS W/CONTRAST: CPT | Mod: 26,MA

## 2023-07-31 PROCEDURE — 87086 URINE CULTURE/COLONY COUNT: CPT

## 2023-07-31 PROCEDURE — 96374 THER/PROPH/DIAG INJ IV PUSH: CPT | Mod: XU

## 2023-07-31 PROCEDURE — 36415 COLL VENOUS BLD VENIPUNCTURE: CPT

## 2023-07-31 PROCEDURE — 99285 EMERGENCY DEPT VISIT HI MDM: CPT

## 2023-07-31 PROCEDURE — 83605 ASSAY OF LACTIC ACID: CPT

## 2023-07-31 PROCEDURE — 96361 HYDRATE IV INFUSION ADD-ON: CPT

## 2023-07-31 RX ORDER — ACETAMINOPHEN 500 MG
975 TABLET ORAL ONCE
Refills: 0 | Status: COMPLETED | OUTPATIENT
Start: 2023-07-31 | End: 2023-07-31

## 2023-07-31 RX ORDER — SODIUM CHLORIDE 9 MG/ML
1000 INJECTION INTRAMUSCULAR; INTRAVENOUS; SUBCUTANEOUS ONCE
Refills: 0 | Status: COMPLETED | OUTPATIENT
Start: 2023-07-31 | End: 2023-07-31

## 2023-07-31 RX ORDER — KETOROLAC TROMETHAMINE 30 MG/ML
15 SYRINGE (ML) INJECTION ONCE
Refills: 0 | Status: DISCONTINUED | OUTPATIENT
Start: 2023-07-31 | End: 2023-07-31

## 2023-07-31 RX ADMIN — SODIUM CHLORIDE 1000 MILLILITER(S): 9 INJECTION INTRAMUSCULAR; INTRAVENOUS; SUBCUTANEOUS at 02:57

## 2023-07-31 RX ADMIN — Medication 975 MILLIGRAM(S): at 03:27

## 2023-07-31 RX ADMIN — SODIUM CHLORIDE 1000 MILLILITER(S): 9 INJECTION INTRAMUSCULAR; INTRAVENOUS; SUBCUTANEOUS at 04:09

## 2023-07-31 RX ADMIN — Medication 975 MILLIGRAM(S): at 02:57

## 2023-07-31 RX ADMIN — Medication 15 MILLIGRAM(S): at 04:39

## 2023-07-31 RX ADMIN — Medication 15 MILLIGRAM(S): at 04:09

## 2023-07-31 RX ADMIN — SODIUM CHLORIDE 1000 MILLILITER(S): 9 INJECTION INTRAMUSCULAR; INTRAVENOUS; SUBCUTANEOUS at 04:45

## 2023-07-31 NOTE — ED PROVIDER NOTE - PATIENT PORTAL LINK FT
You can access the FollowMyHealth Patient Portal offered by [facility] by registering at the following website: [email or web address]. By joining [employer]’s FollowMyHealth portal, you will also be able to view your health information using other applications (apps) compatible with our system.

## 2023-07-31 NOTE — ED ADULT TRIAGE NOTE - CHIEF COMPLAINT QUOTE
I'm coming in for sharp pain in my lower left quadrant, when I go to the restroom to move my bowels it hurts all across - patient

## 2023-07-31 NOTE — ED PROVIDER NOTE - OBJECTIVE STATEMENT
28-year-old female with no significant past medical history presents to the ED complaining of mild lower abdominal pain that has been ongoing over the last 3 days.  Pain is sharp, constant, worse when she is having a bowel movement and slightly improved with Tylenol.  She denies history of similar pain in the past.  She denies other complaints. Pt denies fever, chills, nausea, vomiting, diarrhea, headache, dizziness, weakness, chest pain, SOB, back pain, LOC, trauma, urinary symptoms, cough, calf pain/swelling, recent travel, recent surgery.

## 2023-07-31 NOTE — ED PROVIDER NOTE - PHYSICAL EXAMINATION
VITAL SIGNS: I have reviewed nursing notes and confirm.  CONSTITUTIONAL: Well-developed; well-nourished; in no acute distress.  SKIN: Skin exam is warm and dry, no acute rash.  HEAD: Normocephalic; atraumatic.  EYES: Conjunctiva and sclera clear.  ENT: No nasal discharge; airway clear.   NECK: Supple; non tender.  CARD: S1, S2 normal; no murmurs, gallops, or rubs. Regular rate and rhythm.  RESP: No wheezes, rales or rhonchi. Speaking in full sentences.   ABD: Normal bowel sounds; soft; non-distended; (+) mild lower abd TTP; No rebound or guarding. No CVA tenderness.  : Pt deferred.   EXT: Normal ROM. No clubbing, cyanosis or edema.  NEURO: Alert, oriented. Grossly unremarkable. No focal deficits.

## 2023-07-31 NOTE — ED PROVIDER NOTE - CLINICAL SUMMARY MEDICAL DECISION MAKING FREE TEXT BOX
Laboratory results reviewed and discussed with patient. CBC shows normal WBC count, Hb/Hct and platelet count are WNL. BMP shows electrolytes WNL and no OCTAVIO.   patient remained stable in ED, improved well, results of the diagnostic studies reviewed and discussed with patient, Patient remained awake, alert, ambulatory and comfortable, tolerated PO. Discussed with patient in detail about the need for close outpatient follow up and the need to return to ED for any persistent, or worsening symptoms, for any new symptoms/concerns. patient verbalized understanding and agreed. patient is given detail aftercare instructions and is instructed well to f/u as outpatient for further care. Laboratory results reviewed and discussed with patient. CBC shows normal WBC count, Hb/Hct and platelet count are WNL. BMP shows electrolytes WNL and no OCTAVIO. Pregnancy test is negative.   patient remained stable in ED, improved well, results of the diagnostic studies reviewed and discussed with patient, Patient remained awake, alert, ambulatory and comfortable, tolerated PO. Discussed with patient in detail about the need for close outpatient follow up and the need to return to ED for any persistent, or worsening symptoms, for any new symptoms/concerns. patient verbalized understanding and agreed. patient is given detail aftercare instructions and is instructed well to f/u as outpatient for further care. Patient/Caregiver provided printed discharge information.

## 2023-07-31 NOTE — ED PROVIDER NOTE - PROGRESS NOTE DETAILS
Pt reports improvement in symptoms, currently asymptomatic. Discussed results and provided copy to pt. Pt understands to follow-up with PMD/OBGYN. Pt understands to return to ED if symptoms return/worsen. Agreeable to discharge.

## 2023-07-31 NOTE — ED PROVIDER NOTE - ATTENDING APP SHARED VISIT CONTRIBUTION OF CARE
Patient is c/o LLQ abd pain x 3 days, intermittent episodes. Denies trauma. Denies cp/sob/f/c/rash. Denies vaginal bleeding/discharge.   Vitals reviewed.   Patient is awake, alert, answering questions appropriately, appears comfortable and not in any distress.  Lungs: CTA, no wheezing, no crackles.  abd: +BS, mild LLQ tenderness, ND, soft, no rebound, no guarding.   A/P: LLQ abd pain,   labs, CT to r/o Diverticulitis Vs. Obstructive uropathy,   US to r/o ovarian torsion.   reevaluation.

## 2023-07-31 NOTE — ED PROVIDER NOTE - DIFFERENTIAL DIAGNOSIS
Pancreatitis, hepatic failure, obstructive uropathy, diverticulitis, colitis, abscess, bowel obstruction, bowel perforation. Electrolyte abnormalities, OCTAVIO, and GI bleeding. Differential Diagnosis Obstructive uropathy, diverticulitis, colitis, bowel obstruction, bowel perforation. Electrolyte abnormalities, OCTAVIO, and GI bleeding; r/o ovarian torsion.

## 2023-07-31 NOTE — ED ADULT NURSE NOTE - NSFALLUNIVINTERV_ED_ALL_ED
Bed/Stretcher in lowest position, wheels locked, appropriate side rails in place/Call bell, personal items and telephone in reach/Instruct patient to call for assistance before getting out of bed/chair/stretcher/Non-slip footwear applied when patient is off stretcher/[place] to call system/Physically safe environment - no spills, clutter or unnecessary equipment/Purposeful proactive rounding/Room/bathroom lighting operational, light cord in reach

## 2023-08-01 LAB
CULTURE RESULTS: SIGNIFICANT CHANGE UP
SPECIMEN SOURCE: SIGNIFICANT CHANGE UP

## 2024-04-05 NOTE — ED PROVIDER NOTE - TEMPLATE, MLM
Patient came in today for a wound dressing change on the left buttock.  Area looks well and free from infection.  She tolerated this well but states that she is in pain and having a hard time sleeping.  She states that the Lortab is not helping and is wondering if she could get something else?  Patient uses [facility].  Please advise.  
Sent oxycodone, might work better, do not take both oxycodone and lortab.     Lance Monroy MD    
General

## 2024-04-12 ENCOUNTER — APPOINTMENT (OUTPATIENT)
Dept: OBGYN | Facility: CLINIC | Age: 29
End: 2024-04-12

## 2024-08-28 NOTE — OB RN DELIVERY SUMMARY - NS_FETALMONITOR_OBGYN_ALL_OB
External [facility]/External FHR Have Your Skin Lesions Been Treated?: not been treated Is This A New Presentation, Or A Follow-Up?: Skin Lesions How Severe Is Your Skin Lesion?: mild

## 2024-09-23 ENCOUNTER — EMERGENCY (EMERGENCY)
Facility: HOSPITAL | Age: 29
LOS: 0 days | Discharge: HOME | End: 2024-09-23
Attending: STUDENT IN AN ORGANIZED HEALTH CARE EDUCATION/TRAINING PROGRAM
Payer: MEDICAID

## 2024-09-23 VITALS
DIASTOLIC BLOOD PRESSURE: 89 MMHG | WEIGHT: 121.92 LBS | RESPIRATION RATE: 16 BRPM | OXYGEN SATURATION: 100 % | HEART RATE: 72 BPM | SYSTOLIC BLOOD PRESSURE: 147 MMHG | TEMPERATURE: 98 F | HEIGHT: 59 IN

## 2024-09-23 DIAGNOSIS — N39.0 URINARY TRACT INFECTION, SITE NOT SPECIFIED: ICD-10-CM

## 2024-09-23 DIAGNOSIS — H10.029 OTHER MUCOPURULENT CONJUNCTIVITIS, UNSPECIFIED EYE: ICD-10-CM

## 2024-09-23 LAB
ALBUMIN SERPL ELPH-MCNC: 5.1 G/DL — SIGNIFICANT CHANGE UP (ref 3.5–5.2)
ALP SERPL-CCNC: 87 U/L — SIGNIFICANT CHANGE UP (ref 30–115)
ALT FLD-CCNC: 10 U/L — SIGNIFICANT CHANGE UP (ref 0–41)
ANION GAP SERPL CALC-SCNC: 9 MMOL/L — SIGNIFICANT CHANGE UP (ref 7–14)
APPEARANCE UR: ABNORMAL
AST SERPL-CCNC: 16 U/L — SIGNIFICANT CHANGE UP (ref 0–41)
BACTERIA # UR AUTO: ABNORMAL /HPF
BASOPHILS # BLD AUTO: 0.04 K/UL — SIGNIFICANT CHANGE UP (ref 0–0.2)
BASOPHILS NFR BLD AUTO: 0.5 % — SIGNIFICANT CHANGE UP (ref 0–1)
BILIRUB SERPL-MCNC: 0.4 MG/DL — SIGNIFICANT CHANGE UP (ref 0.2–1.2)
BILIRUB UR-MCNC: NEGATIVE — SIGNIFICANT CHANGE UP
BUN SERPL-MCNC: 16 MG/DL — SIGNIFICANT CHANGE UP (ref 10–20)
CALCIUM SERPL-MCNC: 9.6 MG/DL — SIGNIFICANT CHANGE UP (ref 8.4–10.5)
CAST: 1 /LPF — SIGNIFICANT CHANGE UP (ref 0–4)
CHLORIDE SERPL-SCNC: 96 MMOL/L — LOW (ref 98–110)
CO2 SERPL-SCNC: 26 MMOL/L — SIGNIFICANT CHANGE UP (ref 17–32)
COLOR SPEC: SIGNIFICANT CHANGE UP
CREAT SERPL-MCNC: 0.6 MG/DL — LOW (ref 0.7–1.5)
DIFF PNL FLD: ABNORMAL
EGFR: 125 ML/MIN/1.73M2 — SIGNIFICANT CHANGE UP
EOSINOPHIL # BLD AUTO: 0.21 K/UL — SIGNIFICANT CHANGE UP (ref 0–0.7)
EOSINOPHIL NFR BLD AUTO: 2.8 % — SIGNIFICANT CHANGE UP (ref 0–8)
GLUCOSE SERPL-MCNC: 93 MG/DL — SIGNIFICANT CHANGE UP (ref 70–99)
GLUCOSE UR QL: NEGATIVE MG/DL — SIGNIFICANT CHANGE UP
HCT VFR BLD CALC: 37.6 % — SIGNIFICANT CHANGE UP (ref 37–47)
HGB BLD-MCNC: 12.9 G/DL — SIGNIFICANT CHANGE UP (ref 12–16)
IMM GRANULOCYTES NFR BLD AUTO: 0.3 % — SIGNIFICANT CHANGE UP (ref 0.1–0.3)
KETONES UR-MCNC: 40 MG/DL
LACTATE SERPL-SCNC: 1.1 MMOL/L — SIGNIFICANT CHANGE UP (ref 0.7–2)
LEUKOCYTE ESTERASE UR-ACNC: ABNORMAL
LIDOCAIN IGE QN: 17 U/L — SIGNIFICANT CHANGE UP (ref 7–60)
LYMPHOCYTES # BLD AUTO: 2.67 K/UL — SIGNIFICANT CHANGE UP (ref 1.2–3.4)
LYMPHOCYTES # BLD AUTO: 36 % — SIGNIFICANT CHANGE UP (ref 20.5–51.1)
MCHC RBC-ENTMCNC: 30.2 PG — SIGNIFICANT CHANGE UP (ref 27–31)
MCHC RBC-ENTMCNC: 34.3 G/DL — SIGNIFICANT CHANGE UP (ref 32–37)
MCV RBC AUTO: 88.1 FL — SIGNIFICANT CHANGE UP (ref 81–99)
MONOCYTES # BLD AUTO: 0.37 K/UL — SIGNIFICANT CHANGE UP (ref 0.1–0.6)
MONOCYTES NFR BLD AUTO: 5 % — SIGNIFICANT CHANGE UP (ref 1.7–9.3)
NEUTROPHILS # BLD AUTO: 4.11 K/UL — SIGNIFICANT CHANGE UP (ref 1.4–6.5)
NEUTROPHILS NFR BLD AUTO: 55.4 % — SIGNIFICANT CHANGE UP (ref 42.2–75.2)
NITRITE UR-MCNC: NEGATIVE — SIGNIFICANT CHANGE UP
NRBC # BLD: 0 /100 WBCS — SIGNIFICANT CHANGE UP (ref 0–0)
PH UR: 6 — SIGNIFICANT CHANGE UP (ref 5–8)
PLATELET # BLD AUTO: 215 K/UL — SIGNIFICANT CHANGE UP (ref 130–400)
PMV BLD: 11.9 FL — HIGH (ref 7.4–10.4)
POTASSIUM SERPL-MCNC: 3.9 MMOL/L — SIGNIFICANT CHANGE UP (ref 3.5–5)
POTASSIUM SERPL-SCNC: 3.9 MMOL/L — SIGNIFICANT CHANGE UP (ref 3.5–5)
PROT SERPL-MCNC: 8 G/DL — SIGNIFICANT CHANGE UP (ref 6–8)
PROT UR-MCNC: 100 MG/DL
RBC # BLD: 4.27 M/UL — SIGNIFICANT CHANGE UP (ref 4.2–5.4)
RBC # FLD: 12.5 % — SIGNIFICANT CHANGE UP (ref 11.5–14.5)
RBC CASTS # UR COMP ASSIST: 29 /HPF — HIGH (ref 0–4)
SODIUM SERPL-SCNC: 131 MMOL/L — LOW (ref 135–146)
SP GR SPEC: >1.03 — HIGH (ref 1–1.03)
SQUAMOUS # UR AUTO: 14 /HPF — HIGH (ref 0–5)
UROBILINOGEN FLD QL: 1 MG/DL — SIGNIFICANT CHANGE UP (ref 0.2–1)
WBC # BLD: 7.42 K/UL — SIGNIFICANT CHANGE UP (ref 4.8–10.8)
WBC # FLD AUTO: 7.42 K/UL — SIGNIFICANT CHANGE UP (ref 4.8–10.8)
WBC UR QL: 56 /HPF — HIGH (ref 0–5)

## 2024-09-23 PROCEDURE — 76705 ECHO EXAM OF ABDOMEN: CPT

## 2024-09-23 PROCEDURE — 99285 EMERGENCY DEPT VISIT HI MDM: CPT

## 2024-09-23 PROCEDURE — 99284 EMERGENCY DEPT VISIT MOD MDM: CPT | Mod: 25

## 2024-09-23 PROCEDURE — 96375 TX/PRO/DX INJ NEW DRUG ADDON: CPT

## 2024-09-23 PROCEDURE — 87186 SC STD MICRODIL/AGAR DIL: CPT

## 2024-09-23 PROCEDURE — 36415 COLL VENOUS BLD VENIPUNCTURE: CPT

## 2024-09-23 PROCEDURE — 83605 ASSAY OF LACTIC ACID: CPT

## 2024-09-23 PROCEDURE — 83690 ASSAY OF LIPASE: CPT

## 2024-09-23 PROCEDURE — 85025 COMPLETE CBC W/AUTO DIFF WBC: CPT

## 2024-09-23 PROCEDURE — 81001 URINALYSIS AUTO W/SCOPE: CPT

## 2024-09-23 PROCEDURE — 80053 COMPREHEN METABOLIC PANEL: CPT

## 2024-09-23 PROCEDURE — 76830 TRANSVAGINAL US NON-OB: CPT | Mod: 26

## 2024-09-23 PROCEDURE — 74177 CT ABD & PELVIS W/CONTRAST: CPT | Mod: MC

## 2024-09-23 PROCEDURE — 87077 CULTURE AEROBIC IDENTIFY: CPT

## 2024-09-23 PROCEDURE — 96374 THER/PROPH/DIAG INJ IV PUSH: CPT

## 2024-09-23 PROCEDURE — 76830 TRANSVAGINAL US NON-OB: CPT

## 2024-09-23 PROCEDURE — 76705 ECHO EXAM OF ABDOMEN: CPT | Mod: 26

## 2024-09-23 PROCEDURE — 87086 URINE CULTURE/COLONY COUNT: CPT

## 2024-09-23 PROCEDURE — 74177 CT ABD & PELVIS W/CONTRAST: CPT | Mod: 26,MC

## 2024-09-23 RX ORDER — KETOROLAC TROMETHAMINE 30 MG/ML
15 INJECTION, SOLUTION INTRAMUSCULAR ONCE
Refills: 0 | Status: DISCONTINUED | OUTPATIENT
Start: 2024-09-23 | End: 2024-09-23

## 2024-09-23 RX ORDER — IOHEXOL 350 MG/ML
30 INJECTION, SOLUTION INTRAVENOUS ONCE
Refills: 0 | Status: COMPLETED | OUTPATIENT
Start: 2024-09-23 | End: 2024-09-23

## 2024-09-23 RX ORDER — CEFPODOXIME PROXETIL 100 MG/5ML
1 GRANULE, FOR SUSPENSION ORAL
Qty: 14 | Refills: 0
Start: 2024-09-23 | End: 2024-09-29

## 2024-09-23 RX ORDER — ONDANSETRON 2 MG/ML
4 INJECTION, SOLUTION INTRAMUSCULAR; INTRAVENOUS ONCE
Refills: 0 | Status: COMPLETED | OUTPATIENT
Start: 2024-09-23 | End: 2024-09-23

## 2024-09-23 RX ORDER — IOHEXOL 350 MG/ML
30 INJECTION, SOLUTION INTRAVENOUS ONCE
Refills: 0 | Status: DISCONTINUED | OUTPATIENT
Start: 2024-09-23 | End: 2024-09-23

## 2024-09-23 RX ADMIN — IOHEXOL 30 MILLILITER(S): 350 INJECTION, SOLUTION INTRAVENOUS at 10:07

## 2024-09-23 RX ADMIN — Medication 4 MILLIGRAM(S): at 12:02

## 2024-09-23 RX ADMIN — Medication 1000 MILLILITER(S): at 10:13

## 2024-09-23 RX ADMIN — ONDANSETRON 4 MILLIGRAM(S): 2 INJECTION, SOLUTION INTRAMUSCULAR; INTRAVENOUS at 10:13

## 2024-09-23 RX ADMIN — Medication 100 MILLIGRAM(S): at 13:58

## 2024-09-23 RX ADMIN — KETOROLAC TROMETHAMINE 15 MILLIGRAM(S): 30 INJECTION, SOLUTION INTRAMUSCULAR at 10:13

## 2024-09-23 NOTE — ED PROVIDER NOTE - CARE PROVIDER_API CALL
Tasneem Meyer  NP in Family Health  [address] [place] [id number]  Phone: [phone]  Fax: [phone]  Follow Up Time: 4-6 Days

## 2024-09-23 NOTE — ED PROVIDER NOTE - ATTENDING CONTRIBUTION TO CARE
29-year-old female G1, P1 who presents with abdominal pain.  Patient states for last 2 days she has been having right lower quadrant abdominal pain.  Patient also states that she has been having pain to the left lower quadrant.  Patient denies any nausea vomiting fever chills or any other medical complaints. Pt denies any vaginal bleeding, vaginal discharge or urinary symptoms.     VITAL SIGNS: I have reviewed nursing notes and confirm.  CONSTITUTIONAL: non-toxic, well appearing  SKIN: no rash, no petechiae.  EYES: PERRL, EOMI, pink conjunctiva, anicteric  ENT: tongue midline, no exudates, MMM  NECK: Supple; no meningismus, no JVD  CARD: RRR, no murmurs, equal radial pulses bilaterally 2+  RESP: CTAB, no respiratory distress  ABD: Soft, lower abd pain, non-distended, no peritoneal signs, no HSM, no CVA tenderness    29-year-old female presents with right lower quadrant pain.  Labs UA imaging pain management reassess dispo pending

## 2024-09-23 NOTE — ED ADULT NURSE NOTE - SUICIDE SCREENING QUESTION 1
Infectious Diseases Daily Progress Note      Patient's Name: Silvia Meyer   Date of Service: 4/29/2018    I Date of admission: 4/23/2018  I  Hospital Day: 7  Admitting Diagnosis:  J18.9 Pneumonia, unspecified organism  (primary encounter diagnosis)  N18.9 Chronic renal failure, unspecified CKD stage  J98.01 Bronchospasm                       Scheduled Medications     polyethylene glycol 17 g Daily   epoetin nata 15,000 Units Once per day on Mon Wed Fri   ceFEPime (MAXIPIME) IVPB for Most Indications 1,000 mg Daily   azithromycin 500 mg Daily   furosemide 40 mg BID   amLODIPine 10 mg Daily   aspirin 325 mg Daily   cloNIDine 0.3 mg 3 times per day   clopidogrel 75 mg Daily   hydrALAZINE 100 mg TID   isosorbide mononitrate 30 mg Daily   mexiletine 150 mg TID   ergocalciferol 50,000 Units Once per day on Mon Thu   metoPROLOL succinate 100 mg Daily   sodium chloride (PF) 2 mL 2 times per day   heparin (porcine) 5,000 Units 3 times per day   terazosin 2 mg Nightly     Infusions:  â¢ sodium chloride 0.9% infusion       Past Medical History, Social History, Medications and Allergies reviewed. Reviewed Pertinent: Laboratory studies, radiographic studies, medications, and recent progress notes. Subjective:  resp status better, off O2. Ambulating. No new complaints. Review of Systems: No fever or chills. No nausea, abdominal pain, or diarrhea. No rashes. No  chest pain. No urinary symptoms. Objective:    VITAL SIGNS  Temp  Min: 97.3 Â°F (36.3 Â°C)  Max: 98.7 Â°F (37.1 Â°C)  Temp:  [97.3 Â°F (36.3 Â°C)-98.7 Â°F (37.1 Â°C)] 97.3 Â°F (36.3 Â°C)  Pulse:  [61-67] 61  Resp:  [18-20] 20  BP: (128-139)/(70-76) 139/76  PHYSICAL EXAMINATION:  GENERAL:  awake. NAD, on RA  HEENT:  Normocephalic, atraumatic. Pupils are equal.  No scleral icterus. no thrush. Posterior pharynx is clear. CARDIOVASCULAR:  Pulses are regular. S1, S2 normal.  No murmurs appreciated. PULMONARY: lungs are bilaterally clear.   Improved exam. No expiratory wheezes. GASTROINTESTINAL:  Abdomen soft, nontender, no distention. EXTREMITIES:  No edema   CHEST:    right upper chest PermCath. LABORATORY DATA:    Recent Labs  Lab 04/28/18  0542 04/27/18  0405 04/26/18  0325  04/24/18  0436 04/23/18  0527   SODIUM 139 141 143  < > 143 145   BUN 31* 44* 52*  < > 62* 64*   CREATININE 5.93* 6.20* 6.78*  < > 7.79* 7.65*   GFRNA 9 9 8  < > 7 7   GFRA 11 10 9  < > 8 8   GLUCOSE 116* 101* 99  < > 100* 97   CALCIUM 8.2* 8.2* 8.2*  < > 7.5* 7.5*   ALBUMIN  --  2.3*  --   --  2.3*  --    AST  --  15  --   --  19  --    GPT  --  14  --   --  17  --    ALKPT  --  98  --   --  98  --    BILIRUBIN  --  0.4  --   --  0.2  --    BNP  --   --   --   --   --  2,296*   < > = values in this interval not displayed. Recent Labs  Lab 04/28/18  0542 04/27/18  1223 04/26/18  0325   WBC 12.7* 11.2* 14.1*   HGB 8.3* 7.1* 8.2*   HCT 27.3* 23.1* 25.8*    259 271     Sputum culture was sent at the time of admission, on 04/23/2018 shows mixed respiratory alejandra. Blood cultures from 04/23/2018 are negative. Blood cx 4/26/18 neg  Resp pathogen panel is neg  Urine Legionella and urine strep antigen are negative. Chest x-ray from admission 04/23/2018 is reviewed. Chest x-ray 04/28/2018   reviewed. IMPRESSION:  Community-acquired pneumonia in a dialysis patient. End-stage renal disease, on hemodialysis. Fluid overload. Hemoptysis. PLAN AND RECOMMENDATIONS:  Continue  Cefepime and azithromycin.   Day 7 of 10 for abx              Jason Powell MD  Infectious Diseases  [phone] (P)   [phone] (A)  4/29/2018     2:01 PM No

## 2024-09-23 NOTE — ED PROVIDER NOTE - PHYSICAL EXAMINATION
VITAL SIGNS: I have reviewed nursing notes and confirm.  CONSTITUTIONAL: Well-appearing, non-toxic, in NAD  SKIN: Warm dry, normal skin turgor  HEAD: NCAT  EYES: No conjunctival injection, scleral anicteric  ENT: Moist mucous membranes, normal pharynx with no erythema or exudates  NECK: Supple; full ROM. Nontender. No cervical LAD  CARD: RRR, no murmurs, rubs or gallops  RESP: Clear to ausculation bilaterally.  No rales, rhonchi, or wheezing.  ABD: Soft, non-distended, RLQ-tenderness, no rebound or guarding. No CVA tenderness  EXT: Full ROM, no bony tenderness, no pedal edema, no calf tenderness

## 2024-09-23 NOTE — ED ADULT TRIAGE NOTE - BMI (KG/M2)
Additional Notes: Patient consent was obtained to proceed with the visit and recommended plan of care after discussion of all risks and benefits, including the risks of COVID-19 exposure.
Detail Level: Simple
24.6

## 2024-09-23 NOTE — ED PROVIDER NOTE - PATIENT PORTAL LINK FT
You can access the FollowMyHealth Patient Portal offered by [facility] by registering at the following website: [email or web address]. By joining [employer]’s FollowMyHealth portal, you will also be able to view your health information using other applications (apps) compatible with our system.

## 2024-09-23 NOTE — ED PROVIDER NOTE - OBJECTIVE STATEMENT
Patient is a 29-year-old female with no significant PMH presenting for abdominal pain. Patient endorses a sudden onset of RLQ abdominal/pubic tenderness x2 days. Patient denies recent trauma, nausea, vomiting, dysuria, hematuria, chest pain, shortness of breath, back pain, flank tenderness, or additional complaints.

## 2024-09-23 NOTE — ED PROVIDER NOTE - NSFOLLOWUPINSTRUCTIONS_ED_ALL_ED_FT
Urinary Tract Infection    A urinary tract infection (UTI) is an infection of any part of the urinary tract, which includes the kidneys, ureters, bladder, and urethra. Risk factors include ignoring your need to urinate, wiping back to front if female, being an uncircumcised male, and having diabetes or a weak immune system. Symptoms include frequent urination, pain or burning with urination, foul smelling urine, cloudy urine, pain in the lower abdomen, blood in the urine, and fever. If you were prescribed an antibiotic medicine, take it as told by your health care provider. Do not stop taking the antibiotic even if you start to feel better.    SEEK IMMEDIATE MEDICAL CARE IF YOU HAVE THE FOLLOWING SYMPTOMS: severe back or abdominal pain, inability to keep fluids or medicine down, dizziness/lightheadedness, or a change in mental status.      Pelvic Pain, Female  Pelvic pain is pain in your lower abdomen, below your belly button and between your hips. The pain may start suddenly (be acute), keep coming back (be recurring), or last a long time (become chronic). Pelvic pain that lasts longer than 6 months is considered chronic.    Pelvic pain may affect your:  Reproductive organs.  Urinary system.  Digestive tract.  Musculoskeletal system.  There are many potential causes of pelvic pain. Sometimes, the pain can be a result of digestive or urinary conditions, strained muscles or ligaments, or reproductive conditions. Sometimes the cause of pelvic pain is not known.    Follow these instructions at home:  Person sitting at a desk and writing in a notebook.  Take over-the-counter and prescription medicines only as told by your health care provider.  Rest as told by your health care provider.  Do not have sex if it hurts.  Keep a journal of your pelvic pain. Write down:  When the pain started.  Where the pain is located.  What seems to make the pain better or worse, such as food or your monthly period (menstrual cycle).  Any symptoms you have along with the pain.  Keep all follow-up visits. This is important.  Contact a health care provider if:  Medicine does not help your pain, or your pain comes back.  You have new symptoms.  You have abnormal vaginal discharge or bleeding, including bleeding after menopause.  You have a fever or chills.  You are constipated.  You have blood in your urine or stool (feces).  You have foul-smelling urine.  You feel weak or light-headed.  Get help right away if:  You have sudden severe pain.  Your pain gets steadily worse.  You have severe pain along with fever, nausea, vomiting, or excessive sweating.  You lose consciousness.  These symptoms may represent a serious problem that is an emergency. Do not wait to see if the symptoms will go away. Get medical help right away. Call your local emergency services (911 in the [place] Do not drive yourself to the hospital.    Summary  Pelvic pain is pain in your lower abdomen, below your belly button and between your hips.  There are many potential causes of pelvic pain.  Keep a journal of your pelvic pain.  This information is not intended to replace advice given to you by your health care provider. Make sure you discuss any questions you have with your health care provider.

## 2024-09-23 NOTE — ED PROVIDER NOTE - CLINICAL SUMMARY MEDICAL DECISION MAKING FREE TEXT BOX
Patient signed out to me from Dr. Burns. Labs  were ordered and reviewed.  Imaging was ordered and reviewed by me.  Appropriate medications for patient's presenting complaints were ordered and effects were reassessed. Pt has UTI, abx sent to pharmacy. Nontender abdomen on my reassessment. Patient's records (prior hospital, ED visit, and/or nursing home notes if available) were reviewed.  Additional history was obtained from EMS, family, and/or PCP (where available).  Escalation to admission/observation was considered.   However patient feels much better and is comfortable with discharge.  Appropriate follow-up was arranged. Return precautions discussed in detail.

## 2024-09-23 NOTE — ED PROVIDER NOTE - PROGRESS NOTE DETAILS
FF: I discussed lab and radiology results with pt. pt reports she is feeling well. pt has an upcoming appt with her obgyn in october. pt advised of strict return precautions discussed at bedside. agreeable to dc. f/u with pcp and obgyn.

## 2024-09-23 NOTE — ED PROVIDER NOTE - DIFFERENTIAL DIAGNOSIS
The differential diagnosis for patients clinical presentation includes but is not limited to: uti, pyelo, ovarian cyst Differential Diagnosis

## 2024-09-26 LAB
-  AMPICILLIN/SULBACTAM: SIGNIFICANT CHANGE UP
-  AMPICILLIN: SIGNIFICANT CHANGE UP
-  AZTREONAM: SIGNIFICANT CHANGE UP
-  CEFAZOLIN: SIGNIFICANT CHANGE UP
-  CEFEPIME: SIGNIFICANT CHANGE UP
-  CEFTRIAXONE: SIGNIFICANT CHANGE UP
-  CEFUROXIME: SIGNIFICANT CHANGE UP
-  CIPROFLOXACIN: SIGNIFICANT CHANGE UP
-  ERTAPENEM: SIGNIFICANT CHANGE UP
-  GENTAMICIN: SIGNIFICANT CHANGE UP
-  IMIPENEM: SIGNIFICANT CHANGE UP
-  LEVOFLOXACIN: SIGNIFICANT CHANGE UP
-  MEROPENEM: SIGNIFICANT CHANGE UP
-  NITROFURANTOIN: SIGNIFICANT CHANGE UP
-  PIPERACILLIN/TAZOBACTAM: SIGNIFICANT CHANGE UP
-  TOBRAMYCIN: SIGNIFICANT CHANGE UP
-  TRIMETHOPRIM/SULFAMETHOXAZOLE: SIGNIFICANT CHANGE UP
CULTURE RESULTS: ABNORMAL
METHOD TYPE: SIGNIFICANT CHANGE UP
ORGANISM # SPEC MICROSCOPIC CNT: ABNORMAL
ORGANISM # SPEC MICROSCOPIC CNT: SIGNIFICANT CHANGE UP
SPECIMEN SOURCE: SIGNIFICANT CHANGE UP

## 2024-09-26 RX ORDER — LEVOFLOXACIN 5 MG/ML
1 INJECTION, SOLUTION INTRAVENOUS
Qty: 5 | Refills: 0
Start: 2024-09-26 | End: 2024-09-30

## 2024-10-15 ENCOUNTER — OUTPATIENT (OUTPATIENT)
Dept: OUTPATIENT SERVICES | Facility: HOSPITAL | Age: 29
LOS: 1 days | End: 2024-10-15
Payer: MEDICAID

## 2024-10-15 ENCOUNTER — LABORATORY RESULT (OUTPATIENT)
Age: 29
End: 2024-10-15

## 2024-10-15 ENCOUNTER — APPOINTMENT (OUTPATIENT)
Dept: OBGYN | Facility: CLINIC | Age: 29
End: 2024-10-15
Payer: MEDICAID

## 2024-10-15 VITALS
DIASTOLIC BLOOD PRESSURE: 60 MMHG | BODY MASS INDEX: 24.66 KG/M2 | WEIGHT: 122.31 LBS | HEIGHT: 59 IN | SYSTOLIC BLOOD PRESSURE: 98 MMHG

## 2024-10-15 DIAGNOSIS — Z01.419 ENCOUNTER FOR GYNECOLOGICAL EXAMINATION (GENERAL) (ROUTINE) W/OUT ABNORMAL FINDINGS: ICD-10-CM

## 2024-10-15 DIAGNOSIS — Z01.419 ENCOUNTER FOR GYNECOLOGICAL EXAMINATION (GENERAL) (ROUTINE) WITHOUT ABNORMAL FINDINGS: ICD-10-CM

## 2024-10-15 PROCEDURE — 99395 PREV VISIT EST AGE 18-39: CPT

## 2024-10-15 PROCEDURE — 99459 PELVIC EXAMINATION: CPT

## 2024-10-15 PROCEDURE — 87661 TRICHOMONAS VAGINALIS AMPLIF: CPT

## 2024-10-15 PROCEDURE — 87591 N.GONORRHOEAE DNA AMP PROB: CPT

## 2024-10-15 PROCEDURE — 87491 CHLMYD TRACH DNA AMP PROBE: CPT

## 2024-10-16 DIAGNOSIS — Z01.419 ENCOUNTER FOR GYNECOLOGICAL EXAMINATION (GENERAL) (ROUTINE) WITHOUT ABNORMAL FINDINGS: ICD-10-CM

## 2024-12-31 ENCOUNTER — APPOINTMENT (OUTPATIENT)
Dept: OBGYN | Facility: CLINIC | Age: 29
End: 2024-12-31
Payer: MEDICAID

## 2024-12-31 ENCOUNTER — OUTPATIENT (OUTPATIENT)
Dept: OUTPATIENT SERVICES | Facility: HOSPITAL | Age: 29
LOS: 1 days | End: 2024-12-31
Payer: MEDICAID

## 2024-12-31 ENCOUNTER — LABORATORY RESULT (OUTPATIENT)
Age: 29
End: 2024-12-31

## 2024-12-31 VITALS — WEIGHT: 121 LBS | SYSTOLIC BLOOD PRESSURE: 117 MMHG | BODY MASS INDEX: 24.44 KG/M2 | DIASTOLIC BLOOD PRESSURE: 76 MMHG

## 2024-12-31 DIAGNOSIS — Z34.90 ENCOUNTER FOR SUPERVISION OF NORMAL PREGNANCY, UNSPECIFIED, UNSPECIFIED TRIMESTER: ICD-10-CM

## 2024-12-31 DIAGNOSIS — Z34.81 ENCOUNTER FOR SUPERVISION OF OTHER NORMAL PREGNANCY, FIRST TRIMESTER: ICD-10-CM

## 2024-12-31 DIAGNOSIS — Z00.00 ENCOUNTER FOR GENERAL ADULT MEDICAL EXAMINATION W/OUT ABNORMAL FINDINGS: ICD-10-CM

## 2024-12-31 PROCEDURE — 81002 URINALYSIS NONAUTO W/O SCOPE: CPT

## 2024-12-31 PROCEDURE — 87591 N.GONORRHOEAE DNA AMP PROB: CPT

## 2024-12-31 PROCEDURE — 99214 OFFICE O/P EST MOD 30 MIN: CPT | Mod: 25

## 2024-12-31 PROCEDURE — 99459 PELVIC EXAMINATION: CPT

## 2024-12-31 PROCEDURE — 87661 TRICHOMONAS VAGINALIS AMPLIF: CPT

## 2024-12-31 PROCEDURE — 76815 OB US LIMITED FETUS(S): CPT | Mod: 26

## 2024-12-31 PROCEDURE — 87491 CHLMYD TRACH DNA AMP PROBE: CPT

## 2025-01-04 ENCOUNTER — LABORATORY RESULT (OUTPATIENT)
Age: 30
End: 2025-01-04

## 2025-01-04 ENCOUNTER — OUTPATIENT (OUTPATIENT)
Dept: OUTPATIENT SERVICES | Facility: HOSPITAL | Age: 30
LOS: 1 days | End: 2025-01-04
Payer: MEDICAID

## 2025-01-04 DIAGNOSIS — Z00.00 ENCOUNTER FOR GENERAL ADULT MEDICAL EXAMINATION WITHOUT ABNORMAL FINDINGS: ICD-10-CM

## 2025-01-04 PROCEDURE — 81422 FETAL CHRMOML MICRODELTJ: CPT

## 2025-01-04 PROCEDURE — 81220 CFTR GENE COM VARIANTS: CPT

## 2025-01-04 PROCEDURE — 86900 BLOOD TYPING SEROLOGIC ABO: CPT

## 2025-01-04 PROCEDURE — 83020 HEMOGLOBIN ELECTROPHORESIS: CPT | Mod: 26

## 2025-01-04 PROCEDURE — 81420 FETAL CHRMOML ANEUPLOIDY: CPT

## 2025-01-04 PROCEDURE — 83655 ASSAY OF LEAD: CPT

## 2025-01-04 PROCEDURE — 81257 HBA1/HBA2 GENE: CPT

## 2025-01-04 PROCEDURE — 81329 SMN1 GENE DOS/DELETION ALYS: CPT

## 2025-01-04 PROCEDURE — 81443 GENETIC TSTG SEVERE INH COND: CPT

## 2025-01-04 PROCEDURE — 86762 RUBELLA ANTIBODY: CPT

## 2025-01-04 PROCEDURE — 86787 VARICELLA-ZOSTER ANTIBODY: CPT

## 2025-01-04 PROCEDURE — 86850 RBC ANTIBODY SCREEN: CPT

## 2025-01-04 PROCEDURE — 85027 COMPLETE CBC AUTOMATED: CPT

## 2025-01-04 PROCEDURE — 87086 URINE CULTURE/COLONY COUNT: CPT

## 2025-01-04 PROCEDURE — 87389 HIV-1 AG W/HIV-1&-2 AB AG IA: CPT

## 2025-01-04 PROCEDURE — 81001 URINALYSIS AUTO W/SCOPE: CPT

## 2025-01-04 PROCEDURE — 83020 HEMOGLOBIN ELECTROPHORESIS: CPT

## 2025-01-04 PROCEDURE — 81243 FMR1 GEN ALY DETC ABNL ALLEL: CPT

## 2025-01-04 PROCEDURE — 82728 ASSAY OF FERRITIN: CPT

## 2025-01-04 PROCEDURE — 86803 HEPATITIS C AB TEST: CPT

## 2025-01-04 PROCEDURE — 86780 TREPONEMA PALLIDUM: CPT

## 2025-01-04 PROCEDURE — 87340 HEPATITIS B SURFACE AG IA: CPT

## 2025-01-04 PROCEDURE — 86765 RUBEOLA ANTIBODY: CPT

## 2025-01-04 PROCEDURE — 81222 CFTR GENE DUP/DELET VARIANTS: CPT

## 2025-01-05 DIAGNOSIS — Z00.00 ENCOUNTER FOR GENERAL ADULT MEDICAL EXAMINATION WITHOUT ABNORMAL FINDINGS: ICD-10-CM

## 2025-01-06 LAB
ABO + RH PNL BLD: NORMAL
APPEARANCE: CLEAR
BACTERIA UR CULT: NORMAL
BASOPHILS # BLD AUTO: 0.04 K/UL
BASOPHILS NFR BLD AUTO: 0.4 %
BILIRUBIN URINE: NEGATIVE
BLD GP AB SCN SERPL QL: NORMAL
BLOOD URINE: ABNORMAL
COLOR: YELLOW
EOSINOPHIL # BLD AUTO: 0.22 K/UL
EOSINOPHIL NFR BLD AUTO: 2.3 %
FERRITIN SERPL-MCNC: 75 NG/ML
GLUCOSE QUALITATIVE U: NEGATIVE MG/DL
HBV SURFACE AG SER QL: NONREACTIVE
HCT VFR BLD CALC: 34.8 %
HCV AB SER QL: NONREACTIVE
HCV S/CO RATIO: 0.05 COI
HGB BLD-MCNC: 12 G/DL
HIV1+2 AB SPEC QL IA.RAPID: NONREACTIVE
IMM GRANULOCYTES NFR BLD AUTO: 0.4 %
KETONES URINE: NEGATIVE MG/DL
LEUKOCYTE ESTERASE URINE: NEGATIVE
LYMPHOCYTES # BLD AUTO: 2.6 K/UL
LYMPHOCYTES NFR BLD AUTO: 26.9 %
MAN DIFF?: NORMAL
MCHC RBC-ENTMCNC: 30.2 PG
MCHC RBC-ENTMCNC: 34.5 G/DL
MCV RBC AUTO: 87.7 FL
MEV IGG FLD QL IA: 271 AU/ML
MEV IGG+IGM SER-IMP: POSITIVE
MONOCYTES # BLD AUTO: 0.44 K/UL
MONOCYTES NFR BLD AUTO: 4.6 %
NEUTROPHILS # BLD AUTO: 6.32 K/UL
NEUTROPHILS NFR BLD AUTO: 65.4 %
NITRITE URINE: NEGATIVE
PH URINE: 5.5
PLATELET # BLD AUTO: 263 K/UL
PMV BLD AUTO: 0 /100 WBCS
PROTEIN URINE: NORMAL MG/DL
RBC # BLD: 3.97 M/UL
RBC # FLD: 11.8 %
RUBV IGG FLD-ACNC: 1.57 INDEX
RUBV IGG SER-IMP: POSITIVE
SPECIFIC GRAVITY URINE: 1.02
T PALLIDUM AB SER QL IA: NEGATIVE
UROBILINOGEN URINE: 0.2 MG/DL
VZV AB TITR SER: POSITIVE
VZV IGG SER IF-ACNC: 8.76 S/CO
WBC # FLD AUTO: 9.66 K/UL

## 2025-01-07 ENCOUNTER — APPOINTMENT (OUTPATIENT)
Dept: OBGYN | Facility: CLINIC | Age: 30
End: 2025-01-07

## 2025-01-07 ENCOUNTER — NON-APPOINTMENT (OUTPATIENT)
Age: 30
End: 2025-01-07

## 2025-01-10 LAB
HGB A MFR BLD: 97.5 %
HGB A2 MFR BLD: 2.5 %
HGB FRACT BLD-IMP: NORMAL
LEAD BLD-MCNC: <1 UG/DL

## 2025-01-12 LAB
1P36 DELETION SYN POPULATION-BASED RISK: NORMAL
1P36 DELETION SYNDROME RESULT: NORMAL
1P36 DELETION SYNDROME RISK AFTER TEST: NORMAL
22Q11.2 DELETION POPULATION-BASED RISK: NORMAL
22Q11.2 DELETION RISK AFTER TEST: NORMAL
22Q11.2 DELETION SYNDROME RESULT: NORMAL
ANGELMAN SYNDROME POPULATION-BASED RISK: NORMAL
ANGELMAN SYNDROME RESULT: NORMAL
ANGELMAN SYNDROME RISK AFTER TEST: NORMAL
CRI-DU-CHAT SYNDR POPULATION-BASED RISK: NORMAL
CRI-DU-CHAT SYNDROME RESULT: NORMAL
CRI-DU-CHAT SYNDROME RISK AFTER TEST: NORMAL
FETAL FRACTION: NORMAL
GENDER OF FETUS: NORMAL
GENE DIS ANL CARRIER INTERP-IMP: NORMAL
MONOSOMY X AGE-BASED RISK: NORMAL
MONOSOMY X RESULT: NORMAL
MONOSOMY X RISK AFTER TEST: NORMAL
PANORAMA SCREEN: NORMAL
PRADER-WILLI SYND POPULATION-BASED RISK: NORMAL
PRADER-WILLI SYNDROME RESULT: NORMAL
PRADER-WILLI SYNDROME RISK AFTER TEST: NORMAL
RPT COMMENT: NORMAL
TEST PERFORMANCE INFO SPEC: NORMAL
TRIPLOIDY RESULT: NORMAL
TRISOMY 13 AGE-BASED RISK: NORMAL
TRISOMY 13 RESULT: NORMAL
TRISOMY 13 RISK AFTER TEST: NORMAL
TRISOMY 18 AGE-BASED RISK: NORMAL
TRISOMY 18 RESULT: NORMAL
TRISOMY 18 RISK AFTER TEST: NORMAL
TRISOMY 21 RESULT: NORMAL
TRISOMY 21 RISK AFTER TEST: NORMAL

## 2025-01-13 ENCOUNTER — ASOB RESULT (OUTPATIENT)
Age: 30
End: 2025-01-13

## 2025-01-13 ENCOUNTER — APPOINTMENT (OUTPATIENT)
Dept: ANTEPARTUM | Facility: CLINIC | Age: 30
End: 2025-01-13
Payer: MEDICAID

## 2025-01-13 ENCOUNTER — OUTPATIENT (OUTPATIENT)
Dept: OUTPATIENT SERVICES | Facility: HOSPITAL | Age: 30
LOS: 1 days | End: 2025-01-13
Payer: MEDICAID

## 2025-01-13 DIAGNOSIS — Z34.90 ENCOUNTER FOR SUPERVISION OF NORMAL PREGNANCY, UNSPECIFIED, UNSPECIFIED TRIMESTER: ICD-10-CM

## 2025-01-13 PROCEDURE — 76801 OB US < 14 WKS SINGLE FETUS: CPT | Mod: 26

## 2025-01-13 PROCEDURE — 76801 OB US < 14 WKS SINGLE FETUS: CPT

## 2025-01-13 PROCEDURE — 76813 OB US NUCHAL MEAS 1 GEST: CPT | Mod: 26

## 2025-01-13 PROCEDURE — 76813 OB US NUCHAL MEAS 1 GEST: CPT

## 2025-01-14 DIAGNOSIS — Z36.89 ENCOUNTER FOR OTHER SPECIFIED ANTENATAL SCREENING: ICD-10-CM

## 2025-01-14 DIAGNOSIS — Z3A.14 14 WEEKS GESTATION OF PREGNANCY: ICD-10-CM

## 2025-01-14 DIAGNOSIS — Z36.82 ENCOUNTER FOR ANTENATAL SCREENING FOR NUCHAL TRANSLUCENCY: ICD-10-CM

## 2025-01-28 ENCOUNTER — NON-APPOINTMENT (OUTPATIENT)
Age: 30
End: 2025-01-28

## 2025-01-28 ENCOUNTER — APPOINTMENT (OUTPATIENT)
Dept: OBGYN | Facility: CLINIC | Age: 30
End: 2025-01-28
Payer: MEDICAID

## 2025-01-28 ENCOUNTER — OUTPATIENT (OUTPATIENT)
Dept: OUTPATIENT SERVICES | Facility: HOSPITAL | Age: 30
LOS: 1 days | End: 2025-01-28

## 2025-01-28 ENCOUNTER — OUTPATIENT (OUTPATIENT)
Dept: OUTPATIENT SERVICES | Facility: HOSPITAL | Age: 30
LOS: 1 days | End: 2025-01-28
Payer: MEDICAID

## 2025-01-28 ENCOUNTER — APPOINTMENT (OUTPATIENT)
Dept: OBGYN | Facility: CLINIC | Age: 30
End: 2025-01-28

## 2025-01-28 VITALS — BODY MASS INDEX: 24.04 KG/M2 | SYSTOLIC BLOOD PRESSURE: 103 MMHG | DIASTOLIC BLOOD PRESSURE: 69 MMHG | WEIGHT: 119 LBS

## 2025-01-28 DIAGNOSIS — Z34.92 ENCOUNTER FOR SUPERVISION OF NORMAL PREGNANCY, UNSPECIFIED, SECOND TRIMESTER: ICD-10-CM

## 2025-01-28 DIAGNOSIS — Z71.3 DIETARY COUNSELING AND SURVEILLANCE: ICD-10-CM

## 2025-01-28 DIAGNOSIS — Z34.90 ENCOUNTER FOR SUPERVISION OF NORMAL PREGNANCY, UNSPECIFIED, UNSPECIFIED TRIMESTER: ICD-10-CM

## 2025-01-28 PROCEDURE — 99213 OFFICE O/P EST LOW 20 MIN: CPT

## 2025-01-31 ENCOUNTER — OUTPATIENT (OUTPATIENT)
Dept: OUTPATIENT SERVICES | Facility: HOSPITAL | Age: 30
LOS: 1 days | End: 2025-01-31
Payer: MEDICAID

## 2025-01-31 ENCOUNTER — APPOINTMENT (OUTPATIENT)
Dept: ANTEPARTUM | Facility: CLINIC | Age: 30
End: 2025-01-31
Payer: MEDICAID

## 2025-01-31 DIAGNOSIS — Z31.5 ENCOUNTER FOR PROCREATIVE GENETIC COUNSELING: ICD-10-CM

## 2025-01-31 LAB
BILIRUB UR QL STRIP: NEGATIVE
CLARITY UR: CLEAR
COLLECTION METHOD: NORMAL
GLUCOSE UR-MCNC: NEGATIVE
HCG UR QL: NEGATIVE EU/DL
HGB UR QL STRIP.AUTO: NEGATIVE
KETONES UR-MCNC: NEGATIVE
LEUKOCYTE ESTERASE UR QL STRIP: NEGATIVE
NITRITE UR QL STRIP: NEGATIVE
PH UR STRIP: 6
PROT UR STRIP-MCNC: NEGATIVE
SP GR UR STRIP: 1.02

## 2025-01-31 PROCEDURE — 96041 GENETIC COUNSELING SVC EA 30: CPT

## 2025-01-31 PROCEDURE — 99211 OFF/OP EST MAY X REQ PHY/QHP: CPT

## 2025-02-15 ENCOUNTER — OUTPATIENT (OUTPATIENT)
Dept: OUTPATIENT SERVICES | Facility: HOSPITAL | Age: 30
LOS: 1 days | End: 2025-02-15
Payer: MEDICAID

## 2025-02-15 DIAGNOSIS — Z34.92 ENCOUNTER FOR SUPERVISION OF NORMAL PREGNANCY, UNSPECIFIED, SECOND TRIMESTER: ICD-10-CM

## 2025-02-15 PROCEDURE — 82105 ALPHA-FETOPROTEIN SERUM: CPT

## 2025-02-15 PROCEDURE — 82950 GLUCOSE TEST: CPT

## 2025-02-16 DIAGNOSIS — Z34.92 ENCOUNTER FOR SUPERVISION OF NORMAL PREGNANCY, UNSPECIFIED, SECOND TRIMESTER: ICD-10-CM

## 2025-02-21 ENCOUNTER — NON-APPOINTMENT (OUTPATIENT)
Age: 30
End: 2025-02-21

## 2025-02-21 ENCOUNTER — APPOINTMENT (OUTPATIENT)
Dept: OBGYN | Facility: CLINIC | Age: 30
End: 2025-02-21
Payer: MEDICAID

## 2025-02-21 ENCOUNTER — OUTPATIENT (OUTPATIENT)
Dept: OUTPATIENT SERVICES | Facility: HOSPITAL | Age: 30
LOS: 1 days | End: 2025-02-21
Payer: MEDICAID

## 2025-02-21 VITALS — WEIGHT: 121 LBS | DIASTOLIC BLOOD PRESSURE: 58 MMHG | SYSTOLIC BLOOD PRESSURE: 92 MMHG | BODY MASS INDEX: 24.44 KG/M2

## 2025-02-21 DIAGNOSIS — Z34.90 ENCOUNTER FOR SUPERVISION OF NORMAL PREGNANCY, UNSPECIFIED, UNSPECIFIED TRIMESTER: ICD-10-CM

## 2025-02-21 PROCEDURE — 99213 OFFICE O/P EST LOW 20 MIN: CPT

## 2025-02-23 LAB
BILIRUB UR QL STRIP: NEGATIVE
CLARITY UR: CLEAR
COLLECTION METHOD: NORMAL
GLUCOSE UR-MCNC: NEGATIVE
HCG UR QL: 0.2 EU/DL
HGB UR QL STRIP.AUTO: NORMAL
KETONES UR-MCNC: NEGATIVE
LEUKOCYTE ESTERASE UR QL STRIP: NORMAL
NITRITE UR QL STRIP: NEGATIVE
PH UR STRIP: 7.5
PROT UR STRIP-MCNC: NEGATIVE
SP GR UR STRIP: 1010

## 2025-02-24 DIAGNOSIS — Z34.92 ENCOUNTER FOR SUPERVISION OF NORMAL PREGNANCY, UNSPECIFIED, SECOND TRIMESTER: ICD-10-CM

## 2025-02-26 ENCOUNTER — APPOINTMENT (OUTPATIENT)
Dept: ANTEPARTUM | Facility: CLINIC | Age: 30
End: 2025-02-26
Payer: MEDICAID

## 2025-02-26 ENCOUNTER — APPOINTMENT (OUTPATIENT)
Dept: ANTEPARTUM | Facility: CLINIC | Age: 30
End: 2025-02-26

## 2025-02-26 ENCOUNTER — NON-APPOINTMENT (OUTPATIENT)
Age: 30
End: 2025-02-26

## 2025-02-26 ENCOUNTER — OUTPATIENT (OUTPATIENT)
Dept: OUTPATIENT SERVICES | Facility: HOSPITAL | Age: 30
LOS: 1 days | End: 2025-02-26
Payer: MEDICAID

## 2025-02-26 ENCOUNTER — ASOB RESULT (OUTPATIENT)
Age: 30
End: 2025-02-26

## 2025-02-26 ENCOUNTER — OUTPATIENT (OUTPATIENT)
Dept: OUTPATIENT SERVICES | Facility: HOSPITAL | Age: 30
LOS: 1 days | End: 2025-02-26

## 2025-02-26 DIAGNOSIS — O28.3 ABNORMAL ULTRASONIC FINDING ON ANTENATAL SCREENING OF MOTHER: ICD-10-CM

## 2025-02-26 DIAGNOSIS — O09.90 SUPERVISION OF HIGH RISK PREGNANCY, UNSPECIFIED, UNSPECIFIED TRIMESTER: ICD-10-CM

## 2025-02-26 DIAGNOSIS — Z34.90 ENCOUNTER FOR SUPERVISION OF NORMAL PREGNANCY, UNSPECIFIED, UNSPECIFIED TRIMESTER: ICD-10-CM

## 2025-02-26 PROCEDURE — 76820 UMBILICAL ARTERY ECHO: CPT | Mod: 26

## 2025-02-26 PROCEDURE — 99204 OFFICE O/P NEW MOD 45 MIN: CPT | Mod: 25

## 2025-02-26 PROCEDURE — 76811 OB US DETAILED SNGL FETUS: CPT | Mod: 26

## 2025-02-26 PROCEDURE — 93976 VASCULAR STUDY: CPT | Mod: 26

## 2025-02-26 PROCEDURE — 99214 OFFICE O/P EST MOD 30 MIN: CPT

## 2025-02-26 PROCEDURE — 99214 OFFICE O/P EST MOD 30 MIN: CPT | Mod: 25

## 2025-02-26 PROCEDURE — 76820 UMBILICAL ARTERY ECHO: CPT

## 2025-02-26 PROCEDURE — 93976 VASCULAR STUDY: CPT

## 2025-02-26 PROCEDURE — 76811 OB US DETAILED SNGL FETUS: CPT

## 2025-02-27 ENCOUNTER — OUTPATIENT (OUTPATIENT)
Dept: OUTPATIENT SERVICES | Facility: HOSPITAL | Age: 30
LOS: 1 days | End: 2025-02-27
Payer: MEDICAID

## 2025-02-27 ENCOUNTER — NON-APPOINTMENT (OUTPATIENT)
Age: 30
End: 2025-02-27

## 2025-02-27 DIAGNOSIS — Z14.8 GENETIC CARRIER OF OTHER DISEASE: ICD-10-CM

## 2025-02-27 DIAGNOSIS — O35.8XX0 MATERNAL CARE FOR OTHER (SUSPECTED) FETAL ABNORMALITY AND DAMAGE, NOT APPLICABLE OR UNSPECIFIED: ICD-10-CM

## 2025-02-27 DIAGNOSIS — O28.3 ABNORMAL ULTRASONIC FINDING ON ANTENATAL SCREENING OF MOTHER: ICD-10-CM

## 2025-02-27 DIAGNOSIS — O09.299 SUPERVISION OF PREGNANCY WITH OTHER POOR REPRODUCTIVE OR OBSTETRIC HISTORY, UNSPECIFIED TRIMESTER: ICD-10-CM

## 2025-02-27 DIAGNOSIS — Z36.3 ENCOUNTER FOR ANTENATAL SCREENING FOR MALFORMATIONS: ICD-10-CM

## 2025-02-27 DIAGNOSIS — O36.5990 MATERNAL CARE FOR OTHER KNOWN OR SUSPECTED POOR FETAL GROWTH, UNSPECIFIED TRIMESTER, NOT APPLICABLE OR UNSPECIFIED: ICD-10-CM

## 2025-02-27 DIAGNOSIS — Z3A.20 20 WEEKS GESTATION OF PREGNANCY: ICD-10-CM

## 2025-02-27 PROCEDURE — 86777 TOXOPLASMA ANTIBODY: CPT

## 2025-02-27 PROCEDURE — 86778 TOXOPLASMA ANTIBODY IGM: CPT

## 2025-02-27 PROCEDURE — 86645 CMV ANTIBODY IGM: CPT

## 2025-02-27 PROCEDURE — 86644 CMV ANTIBODY: CPT

## 2025-02-27 PROCEDURE — 86747 PARVOVIRUS ANTIBODY: CPT

## 2025-02-28 DIAGNOSIS — O28.3 ABNORMAL ULTRASONIC FINDING ON ANTENATAL SCREENING OF MOTHER: ICD-10-CM

## 2025-02-28 LAB
CMV IGG SERPL QL: >10 U/ML
CMV IGG SERPL-IMP: POSITIVE
CMV IGM SERPL QL: <8 AU/ML
CMV IGM SERPL QL: NEGATIVE
T GONDII AB SER-IMP: NEGATIVE
T GONDII AB SER-IMP: NEGATIVE
T GONDII IGG SER QL: <3 IU/ML
T GONDII IGM SER QL: <3 AU/ML

## 2025-03-01 LAB
B19V IGG SER QL IA: 0.28 INDEX
B19V IGG+IGM SER-IMP: NEGATIVE
B19V IGG+IGM SER-IMP: NORMAL
B19V IGM FLD-ACNC: 0.19 INDEX
B19V IGM SER-ACNC: NEGATIVE

## 2025-03-05 LAB — CMV IGG AVIDITY SERPL IA-RTO: 1

## 2025-03-19 ENCOUNTER — NON-APPOINTMENT (OUTPATIENT)
Age: 30
End: 2025-03-19

## 2025-03-21 ENCOUNTER — OUTPATIENT (OUTPATIENT)
Dept: OUTPATIENT SERVICES | Facility: HOSPITAL | Age: 30
LOS: 1 days | End: 2025-03-21
Payer: MEDICAID

## 2025-03-21 ENCOUNTER — APPOINTMENT (OUTPATIENT)
Dept: OBGYN | Facility: CLINIC | Age: 30
End: 2025-03-21
Payer: MEDICAID

## 2025-03-21 VITALS — SYSTOLIC BLOOD PRESSURE: 113 MMHG | BODY MASS INDEX: 25.65 KG/M2 | WEIGHT: 127 LBS | DIASTOLIC BLOOD PRESSURE: 72 MMHG

## 2025-03-21 DIAGNOSIS — Z34.90 ENCOUNTER FOR SUPERVISION OF NORMAL PREGNANCY, UNSPECIFIED, UNSPECIFIED TRIMESTER: ICD-10-CM

## 2025-03-21 PROCEDURE — 99213 OFFICE O/P EST LOW 20 MIN: CPT

## 2025-03-24 DIAGNOSIS — Z34.90 ENCOUNTER FOR SUPERVISION OF NORMAL PREGNANCY, UNSPECIFIED, UNSPECIFIED TRIMESTER: ICD-10-CM

## 2025-03-25 ENCOUNTER — OUTPATIENT (OUTPATIENT)
Dept: OUTPATIENT SERVICES | Facility: HOSPITAL | Age: 30
LOS: 1 days | End: 2025-03-25
Payer: MEDICAID

## 2025-03-25 ENCOUNTER — APPOINTMENT (OUTPATIENT)
Dept: ANTEPARTUM | Facility: CLINIC | Age: 30
End: 2025-03-25
Payer: MEDICAID

## 2025-03-25 ENCOUNTER — ASOB RESULT (OUTPATIENT)
Age: 30
End: 2025-03-25

## 2025-03-25 DIAGNOSIS — Z34.90 ENCOUNTER FOR SUPERVISION OF NORMAL PREGNANCY, UNSPECIFIED, UNSPECIFIED TRIMESTER: ICD-10-CM

## 2025-03-25 PROCEDURE — 76816 OB US FOLLOW-UP PER FETUS: CPT | Mod: 26

## 2025-03-25 PROCEDURE — 99203 OFFICE O/P NEW LOW 30 MIN: CPT | Mod: 25

## 2025-03-25 PROCEDURE — 76820 UMBILICAL ARTERY ECHO: CPT

## 2025-03-25 PROCEDURE — 76816 OB US FOLLOW-UP PER FETUS: CPT

## 2025-03-25 PROCEDURE — 76820 UMBILICAL ARTERY ECHO: CPT | Mod: 26,59

## 2025-03-26 LAB
BILIRUB UR QL STRIP: NEGATIVE
CLARITY UR: CLEAR
COLLECTION METHOD: NORMAL
GLUCOSE UR-MCNC: 500
HCG UR QL: 0.2 EU/DL
HGB UR QL STRIP.AUTO: NORMAL
KETONES UR-MCNC: NEGATIVE
LEUKOCYTE ESTERASE UR QL STRIP: NEGATIVE
NITRITE UR QL STRIP: NEGATIVE
PH UR STRIP: 6.5
PROT UR STRIP-MCNC: NEGATIVE
SP GR UR STRIP: 1.01

## 2025-03-28 DIAGNOSIS — Z03.74 ENCOUNTER FOR SUSPECTED PROBLEM WITH FETAL GROWTH RULED OUT: ICD-10-CM

## 2025-03-28 DIAGNOSIS — O09.299 SUPERVISION OF PREGNANCY WITH OTHER POOR REPRODUCTIVE OR OBSTETRIC HISTORY, UNSPECIFIED TRIMESTER: ICD-10-CM

## 2025-03-28 DIAGNOSIS — O36.5920 MATERNAL CARE FOR OTHER KNOWN OR SUSPECTED POOR FETAL GROWTH, SECOND TRIMESTER, NOT APPLICABLE OR UNSPECIFIED: ICD-10-CM

## 2025-03-28 DIAGNOSIS — O35.8XX0 MATERNAL CARE FOR OTHER (SUSPECTED) FETAL ABNORMALITY AND DAMAGE, NOT APPLICABLE OR UNSPECIFIED: ICD-10-CM

## 2025-03-28 DIAGNOSIS — Z14.8 GENETIC CARRIER OF OTHER DISEASE: ICD-10-CM

## 2025-03-28 DIAGNOSIS — Z3A.24 24 WEEKS GESTATION OF PREGNANCY: ICD-10-CM

## 2025-04-04 ENCOUNTER — OUTPATIENT (OUTPATIENT)
Dept: OUTPATIENT SERVICES | Facility: HOSPITAL | Age: 30
LOS: 1 days | End: 2025-04-04
Payer: MEDICAID

## 2025-04-04 DIAGNOSIS — Z34.92 ENCOUNTER FOR SUPERVISION OF NORMAL PREGNANCY, UNSPECIFIED, SECOND TRIMESTER: ICD-10-CM

## 2025-04-04 PROCEDURE — 82950 GLUCOSE TEST: CPT

## 2025-04-04 PROCEDURE — 85025 COMPLETE CBC W/AUTO DIFF WBC: CPT

## 2025-04-05 DIAGNOSIS — Z34.92 ENCOUNTER FOR SUPERVISION OF NORMAL PREGNANCY, UNSPECIFIED, SECOND TRIMESTER: ICD-10-CM

## 2025-04-15 DIAGNOSIS — Z34.92 ENCOUNTER FOR SUPERVISION OF NORMAL PREGNANCY, UNSPECIFIED, SECOND TRIMESTER: ICD-10-CM

## 2025-04-15 RX ORDER — CHLORHEXIDINE GLUCONATE 4 %
325 (65 FE) LIQUID (ML) TOPICAL TWICE DAILY
Qty: 60 | Refills: 2 | Status: ACTIVE | COMMUNITY
Start: 2025-04-15 | End: 1900-01-01

## 2025-04-17 ENCOUNTER — NON-APPOINTMENT (OUTPATIENT)
Age: 30
End: 2025-04-17

## 2025-04-18 ENCOUNTER — OUTPATIENT (OUTPATIENT)
Dept: OUTPATIENT SERVICES | Facility: HOSPITAL | Age: 30
LOS: 1 days | End: 2025-04-18
Payer: MEDICAID

## 2025-04-18 ENCOUNTER — APPOINTMENT (OUTPATIENT)
Dept: OBGYN | Facility: CLINIC | Age: 30
End: 2025-04-18
Payer: MEDICAID

## 2025-04-18 VITALS
SYSTOLIC BLOOD PRESSURE: 110 MMHG | DIASTOLIC BLOOD PRESSURE: 60 MMHG | WEIGHT: 130 LBS | BODY MASS INDEX: 26.21 KG/M2 | HEIGHT: 59 IN

## 2025-04-18 DIAGNOSIS — Z34.90 ENCOUNTER FOR SUPERVISION OF NORMAL PREGNANCY, UNSPECIFIED, UNSPECIFIED TRIMESTER: ICD-10-CM

## 2025-04-18 PROCEDURE — 99213 OFFICE O/P EST LOW 20 MIN: CPT

## 2025-04-18 PROCEDURE — 90471 IMMUNIZATION ADMIN: CPT

## 2025-04-21 ENCOUNTER — TRANSCRIPTION ENCOUNTER (OUTPATIENT)
Age: 30
End: 2025-04-21

## 2025-04-21 DIAGNOSIS — Z34.92 ENCOUNTER FOR SUPERVISION OF NORMAL PREGNANCY, UNSPECIFIED, SECOND TRIMESTER: ICD-10-CM

## 2025-04-22 ENCOUNTER — ASOB RESULT (OUTPATIENT)
Age: 30
End: 2025-04-22

## 2025-04-22 ENCOUNTER — APPOINTMENT (OUTPATIENT)
Dept: ANTEPARTUM | Facility: CLINIC | Age: 30
End: 2025-04-22
Payer: MEDICAID

## 2025-04-22 ENCOUNTER — OUTPATIENT (OUTPATIENT)
Dept: OUTPATIENT SERVICES | Facility: HOSPITAL | Age: 30
LOS: 1 days | End: 2025-04-22
Payer: MEDICAID

## 2025-04-22 DIAGNOSIS — Z34.90 ENCOUNTER FOR SUPERVISION OF NORMAL PREGNANCY, UNSPECIFIED, UNSPECIFIED TRIMESTER: ICD-10-CM

## 2025-04-22 PROCEDURE — 76816 OB US FOLLOW-UP PER FETUS: CPT

## 2025-04-22 PROCEDURE — 76816 OB US FOLLOW-UP PER FETUS: CPT | Mod: 26

## 2025-04-22 PROCEDURE — 76820 UMBILICAL ARTERY ECHO: CPT | Mod: 26,59

## 2025-04-22 PROCEDURE — 76819 FETAL BIOPHYS PROFIL W/O NST: CPT | Mod: 26,59

## 2025-04-22 PROCEDURE — 76820 UMBILICAL ARTERY ECHO: CPT

## 2025-04-22 PROCEDURE — 76819 FETAL BIOPHYS PROFIL W/O NST: CPT

## 2025-04-23 DIAGNOSIS — O09.299 SUPERVISION OF PREGNANCY WITH OTHER POOR REPRODUCTIVE OR OBSTETRIC HISTORY, UNSPECIFIED TRIMESTER: ICD-10-CM

## 2025-04-23 DIAGNOSIS — Z14.8 GENETIC CARRIER OF OTHER DISEASE: ICD-10-CM

## 2025-04-23 DIAGNOSIS — O35.8XX0 MATERNAL CARE FOR OTHER (SUSPECTED) FETAL ABNORMALITY AND DAMAGE, NOT APPLICABLE OR UNSPECIFIED: ICD-10-CM

## 2025-04-23 DIAGNOSIS — Z3A.28 28 WEEKS GESTATION OF PREGNANCY: ICD-10-CM

## 2025-04-23 DIAGNOSIS — O36.5990 MATERNAL CARE FOR OTHER KNOWN OR SUSPECTED POOR FETAL GROWTH, UNSPECIFIED TRIMESTER, NOT APPLICABLE OR UNSPECIFIED: ICD-10-CM

## 2025-04-29 ENCOUNTER — NON-APPOINTMENT (OUTPATIENT)
Age: 30
End: 2025-04-29

## 2025-05-01 ENCOUNTER — OUTPATIENT (OUTPATIENT)
Dept: OUTPATIENT SERVICES | Facility: HOSPITAL | Age: 30
LOS: 1 days | End: 2025-05-01
Payer: MEDICAID

## 2025-05-01 DIAGNOSIS — Z34.92 ENCOUNTER FOR SUPERVISION OF NORMAL PREGNANCY, UNSPECIFIED, SECOND TRIMESTER: ICD-10-CM

## 2025-05-01 PROCEDURE — 82952 GTT-ADDED SAMPLES: CPT

## 2025-05-01 PROCEDURE — 82951 GLUCOSE TOLERANCE TEST (GTT): CPT

## 2025-05-02 DIAGNOSIS — Z34.92 ENCOUNTER FOR SUPERVISION OF NORMAL PREGNANCY, UNSPECIFIED, SECOND TRIMESTER: ICD-10-CM

## 2025-05-09 ENCOUNTER — APPOINTMENT (OUTPATIENT)
Dept: OBGYN | Facility: CLINIC | Age: 30
End: 2025-05-09
Payer: MEDICAID

## 2025-05-09 ENCOUNTER — NON-APPOINTMENT (OUTPATIENT)
Age: 30
End: 2025-05-09

## 2025-05-09 ENCOUNTER — OUTPATIENT (OUTPATIENT)
Dept: OUTPATIENT SERVICES | Facility: HOSPITAL | Age: 30
LOS: 1 days | End: 2025-05-09
Payer: MEDICAID

## 2025-05-09 VITALS
WEIGHT: 132 LBS | DIASTOLIC BLOOD PRESSURE: 70 MMHG | BODY MASS INDEX: 26.61 KG/M2 | SYSTOLIC BLOOD PRESSURE: 115 MMHG | HEIGHT: 59 IN

## 2025-05-09 DIAGNOSIS — Z34.90 ENCOUNTER FOR SUPERVISION OF NORMAL PREGNANCY, UNSPECIFIED, UNSPECIFIED TRIMESTER: ICD-10-CM

## 2025-05-09 PROCEDURE — 81002 URINALYSIS NONAUTO W/O SCOPE: CPT

## 2025-05-09 PROCEDURE — 99213 OFFICE O/P EST LOW 20 MIN: CPT

## 2025-05-12 DIAGNOSIS — Z34.83 ENCOUNTER FOR SUPERVISION OF OTHER NORMAL PREGNANCY, THIRD TRIMESTER: ICD-10-CM

## 2025-05-14 LAB
BILIRUB UR QL STRIP: NORMAL
CLARITY UR: CLEAR
COLLECTION METHOD: NORMAL
GLUCOSE UR-MCNC: NORMAL
HCG UR QL: 0.2 EU/DL
HGB UR QL STRIP.AUTO: NORMAL
KETONES UR-MCNC: NORMAL
LEUKOCYTE ESTERASE UR QL STRIP: NORMAL
NITRITE UR QL STRIP: NORMAL
PH UR STRIP: 7
PROT UR STRIP-MCNC: NORMAL
SP GR UR STRIP: 1.01

## 2025-05-19 ENCOUNTER — NON-APPOINTMENT (OUTPATIENT)
Age: 30
End: 2025-05-19

## 2025-05-19 ENCOUNTER — OUTPATIENT (OUTPATIENT)
Dept: INPATIENT UNIT | Facility: HOSPITAL | Age: 30
LOS: 1 days | Discharge: HOME | End: 2025-05-19
Payer: MEDICAID

## 2025-05-19 VITALS
TEMPERATURE: 98 F | HEART RATE: 102 BPM | RESPIRATION RATE: 17 BRPM | SYSTOLIC BLOOD PRESSURE: 108 MMHG | DIASTOLIC BLOOD PRESSURE: 55 MMHG

## 2025-05-19 DIAGNOSIS — O26.899 OTHER SPECIFIED PREGNANCY RELATED CONDITIONS, UNSPECIFIED TRIMESTER: ICD-10-CM

## 2025-05-19 PROCEDURE — 99214 OFFICE O/P EST MOD 30 MIN: CPT

## 2025-05-19 PROCEDURE — 87653 STREP B DNA AMP PROBE: CPT

## 2025-05-19 PROCEDURE — 87086 URINE CULTURE/COLONY COUNT: CPT

## 2025-05-19 PROCEDURE — 81001 URINALYSIS AUTO W/SCOPE: CPT

## 2025-05-19 NOTE — OB RN TRIAGE NOTE - FALL HARM RISK - UNIVERSAL INTERVENTIONS
Bed in lowest position, wheels locked, appropriate side rails in place/Call bell, personal items and telephone in reach/Instruct patient to call for assistance before getting out of bed or chair/Non-slip footwear when patient is out of bed/[place] to call system/Physically safe environment - no spills, clutter or unnecessary equipment/Purposeful Proactive Rounding/Room/bathroom lighting operational, light cord in reach

## 2025-05-19 NOTE — OB PROVIDER TRIAGE NOTE - HISTORY OF PRESENT ILLNESS
30 y/o  at 32w, TERRELL 25 by LMP c/w early sono presents with a few episodes of lower abdominal pressure and some tightness that started today. Has been having irregular contractions. Denies LOF, vaginal bleeding. Good fetal movement.     Pregnancy complicated by:   #h/o GDMA1 in P1, current pregnancy elevated GCT, normal GTT. Was told to start fingersticks but has not done so over past couple of weeks.  30 y/o  at 32w, TERRELL 25 by LMP c/w early sono presents with a few episodes of lower abdominal pressure and some tightness that started today. Has been having irregular contractions. Denies LOF, vaginal bleeding. Good fetal movement.     Pregnancy complicated by:   #h/o GDMA1 in P1, current pregnancy elevated GCT, normal GTT. Was told to start fingersticks but has not done so over past couple of weeks.   #[facility] pos for primary ciliary dyskinesia DNAh5, FOB not tested

## 2025-05-19 NOTE — OB PROVIDER TRIAGE NOTE - NSHPLABSRESULTS_GEN_ALL_CORE
The ABCs of the Annual Wellness Visit  Subsequent Medicare Wellness Visit    Subjective    Olu Izquierdo is a 82 y.o. male who presents for a Subsequent Medicare Wellness Visit.    The following portions of the patient's history were reviewed and   updated as appropriate: allergies, current medications, past family history, past medical history, past social history, past surgical history and problem list.    Compared to one year ago, the patient feels his physical   health is the same.    Compared to one year ago, the patient feels his mental   health is the same.    Recent Hospitalizations:  He was not admitted to the hospital during the last year.       Current Medical Providers:  Patient Care Team:  Edenilson Henry MD as PCP - General (Family Medicine)    Outpatient Medications Prior to Visit   Medication Sig Dispense Refill   • acetaminophen (TYLENOL) 500 MG tablet Take 500 mg by mouth Daily. PT STATES HE TAKES 3 TABLETS IN THE AM     • atorvastatin (LIPITOR) 10 MG tablet Take 1 tablet by mouth Daily. 90 tablet 1   • diphenhydrAMINE-acetaminophen (TYLENOL PM)  MG tablet per tablet Take 2 tablets by mouth At Night As Needed for Sleep.     • hydrALAZINE (APRESOLINE) 25 MG tablet TAKE 1 TABLET BY MOUTH TWICE DAILY 180 tablet 1   • hydroCHLOROthiazide (HYDRODIURIL) 12.5 MG tablet TAKE 1 TABLET BY MOUTH DAILY 90 tablet 1   • metoprolol succinate XL (TOPROL-XL) 25 MG 24 hr tablet Take 1 tablet by mouth Daily. 90 tablet 3   • ramipril (ALTACE) 10 MG capsule TAKE 1 CAPSULE BY MOUTH TWICE DAILY 180 capsule 1   • spironolactone (ALDACTONE) 50 MG tablet TAKE 1 TABLET BY MOUTH DAILY 90 tablet 1   • clobetasol (TEMOVATE) 0.05 % cream APPLY TO THE AFFECTED AREA OF SCALP TWICE DAILY AS NEEDED FOR ITCHING AND REDNESS.     • ketoconazole (NIZORAL) 2 % cream Apply 1 application topically to the appropriate area as directed Daily. 60 g 0   • triamcinolone (KENALOG) 0.1 % ointment APPLY TOPICALLY TO THE AFFECTED AREA TWICE  "DAILY AS DIRECTED AS NEEDED FOR ITCHING OR REDNESS       No facility-administered medications prior to visit.       No opioid medication identified on active medication list. I have reviewed chart for other potential  high risk medication/s and harmful drug interactions in the elderly.          Aspirin is not on active medication list.  Aspirin use is not indicated based on review of current medical condition/s. Risk of harm outweighs potential benefits.  .    Patient Active Problem List   Diagnosis   • Essential hypertension   • Hypercholesterolemia   • Lower urinary tract infectious disease   • Chronic midline low back pain without sciatica     Advance Care Planning  Advance Directive is on file.  ACP discussion was held with the patient during this visit. Patient has an advance directive in EMR which is still valid.      Objective    Vitals:    02/27/23 0927   BP: 111/65   Pulse: 83   Temp: 96.9 °F (36.1 °C)   TempSrc: Temporal   SpO2: 98%   Weight: 91.7 kg (202 lb 3.2 oz)   Height: 182.9 cm (72\")     Estimated body mass index is 27.42 kg/m² as calculated from the following:    Height as of this encounter: 182.9 cm (72\").    Weight as of this encounter: 91.7 kg (202 lb 3.2 oz).    BMI is >= 25 and <30. (Overweight) The following options were offered after discussion;: exercise counseling/recommendations      Does the patient have evidence of cognitive impairment? No    Lab Results   Component Value Date    CHLPL 176 01/11/2023    TRIG 89 01/11/2023    HDL 67 (H) 01/11/2023    LDL 93 01/11/2023    VLDL 16 01/11/2023        HEALTH RISK ASSESSMENT    Smoking Status:  Social History     Tobacco Use   Smoking Status Never   Smokeless Tobacco Never     Alcohol Consumption:  Social History     Substance and Sexual Activity   Alcohol Use Yes    Comment: 1 a night      Fall Risk Screen:    STEADI Fall Risk Assessment was completed, and patient is at LOW risk for falls.Assessment completed on:2/27/2023    Depression " Screening:  PHQ-2/PHQ-9 Depression Screening 2/27/2023   Little Interest or Pleasure in Doing Things 0-->not at all   Feeling Down, Depressed or Hopeless 0-->not at all   PHQ-9: Brief Depression Severity Measure Score 0       Health Habits and Functional and Cognitive Screening:  Functional & Cognitive Status 2/27/2023   Do you have difficulty preparing food and eating? No   Do you have difficulty bathing yourself, getting dressed or grooming yourself? No   Do you have difficulty using the toilet? No   Do you have difficulty moving around from place to place? No   Do you have trouble with steps or getting out of a bed or a chair? No   Current Diet Frequent Junk Food   Dental Exam Up to date        Dental Exam Comment -   Eye Exam Up to date   Exercise (times per week) 0 times per week        Exercise Frequency Comment -   Current Exercises Include No Regular Exercise   Current Exercise Activities Include -   Do you need help using the phone?  No   Are you deaf or do you have serious difficulty hearing?  No   Do you need help with transportation? No   Do you need help shopping? No   Do you need help preparing meals?  No   Do you need help with housework?  No   Do you need help with laundry? No   Do you need help taking your medications? No   Do you need help managing money? No   Do you ever drive or ride in a car without wearing a seat belt? No   Have you felt unusual stress, anger or loneliness in the last month? No   Who do you live with? Spouse   If you need help, do you have trouble finding someone available to you? No   Have you been bothered in the last four weeks by sexual problems? No   Do you have difficulty concentrating, remembering or making decisions? No       Age-appropriate Screening Schedule:  Refer to the list below for future screening recommendations based on patient's age, sex and/or medical conditions. Orders for these recommended tests are listed in the plan section. The patient has been  provided with a written plan.    Health Maintenance   Topic Date Due   • TDAP/TD VACCINES (1 - Tdap) Never done   • ZOSTER VACCINE (1 of 2) Never done   • ANNUAL WELLNESS VISIT  01/12/2023   • LIPID PANEL  01/11/2024   • COVID-19 Vaccine  Completed   • INFLUENZA VACCINE  Completed   • Pneumococcal Vaccine 65+  Completed                CMS Preventative Services Quick Reference  Risk Factors Identified During Encounter  Chronic Pain: Natural history and expected course discussed. Questions answered.  The above risks/problems have been discussed with the patient.  Pertinent information has been shared with the patient in the After Visit Summary.  An After Visit Summary and PPPS were made available to the patient.    Follow Up:   Next Medicare Wellness visit to be scheduled in 1 year.       Additional E&M Note during same encounter follows:  Patient has multiple medical problems which are significant and separately identifiable that require additional work above and beyond the Medicare Wellness Visit.      Chief Complaint  Medicare Wellness-subsequent    Subjective        HPI  Olu Izquierdo is also being seen today for hypertension hyperlipidemia and chronic pain.  Also dysthymia.    Hypertension.  Blood pressures running excellent today.  He continues on hydralazine, hydrochlorothiazide, metoprolol, Altace, and spironolactone.  His potassium and creatinine and sodium are normal.    Hyperlipidemia.  The panel overall favorable with a high HDL.  He continues on atorvastatin 10 mg day without as averse effect.    Chronic pain syndrome.  Both back pain also some radiculopathy from the right shoulder down to the right hand.  He states this is a nuisance level issue and not a problem.  He states he has had the pain for many months and/or years and tolerates it.  He does have some depression-like symptoms at times.  He cares for his wife who has multiple medical problems.  But he states he does not feel severely depressed and  "does not want to be on a depression medication.  No heavy alcohol use.  He states he has about 1 ounce of alcohol daily.  No more.         Objective   Vital Signs:  /65   Pulse 83   Temp 96.9 °F (36.1 °C) (Temporal)   Ht 182.9 cm (72\")   Wt 91.7 kg (202 lb 3.2 oz)   SpO2 98%   BMI 27.42 kg/m²     Physical Exam  Constitutional:       Appearance: Normal appearance.   HENT:      Head: Atraumatic.      Mouth/Throat:      Pharynx: Oropharynx is clear. No oropharyngeal exudate or posterior oropharyngeal erythema.   Eyes:      Conjunctiva/sclera: Conjunctivae normal.   Neck:      Thyroid: No thyroid mass, thyromegaly or thyroid tenderness.   Cardiovascular:      Rate and Rhythm: Normal rate and regular rhythm.      Pulses: Normal pulses.      Heart sounds: Normal heart sounds.   Pulmonary:      Effort: Pulmonary effort is normal.      Breath sounds: Normal breath sounds.   Abdominal:      General: Abdomen is flat. There is no distension.      Palpations: Abdomen is soft. There is no mass.      Tenderness: There is no abdominal tenderness.      Hernia: No hernia is present.   Musculoskeletal:         General: Normal range of motion.      Cervical back: Normal range of motion and neck supple. No muscular tenderness.   Lymphadenopathy:      Cervical: No cervical adenopathy.   Skin:     General: Skin is warm and dry.      Findings: No rash.   Neurological:      General: No focal deficit present.      Mental Status: He is alert and oriented to person, place, and time.   Psychiatric:         Mood and Affect: Mood normal.          The following data was reviewed by: Edenilson Henry MD on 02/27/2023:  Common labs    Common Labs 7/8/22 7/8/22 1/11/23 1/11/23 1/11/23    1007 1007 1048 1048 1048   Glucose 107 (A)   105 (A)    BUN 28 (A)   28 (A)    Creatinine 1.34 (A)   1.19    Sodium 139   138    Potassium 4.8   5.1    Chloride 100   100    Calcium 9.7   9.7    Total Protein 7.0   6.8    Albumin 4.6   4.7    Total " Bilirubin 0.7   0.6    Alkaline Phosphatase 94   90    AST (SGOT) 21   20    ALT (SGPT) 21   19    WBC   8.80     Hemoglobin   14.0     Hematocrit   41.4     Platelets   262     Total Cholesterol  176   176   Triglycerides  100   89   HDL Cholesterol  62   67 (A)   LDL Cholesterol   96   93   (A) Abnormal value       Comments are available for some flowsheets but are not being displayed.                      Assessment and Plan   Diagnoses and all orders for this visit:    1. Medicare annual wellness visit, subsequent (Primary)    2. Essential hypertension  -     Comprehensive Metabolic Panel; Future  -     Lipid Panel; Future    3. Hypercholesterolemia  -     Comprehensive Metabolic Panel; Future  -     Lipid Panel; Future    4. Caregiver stress    Other orders  -     Cancel: Tdap Vaccine Greater Than or Equal To 6yo IM      Hypertension.  Well-controlled.  Continue all medication.  Refills recently given.    Hyperlipidemia in the setting of hypertension.  Continue atorvastatin.  Lipid panel overall favorable.    Dysthymia, caregiver stress, chronic pain syndrome.  I once again discussed the addition of duloxetine 20 mg a day and efficacy.  However patient really wants to hold off.  He states he is coping well and does not believe he needs any medication.  In my opinion I do not believe he has major depression.  I will see him back in 6-month sooner as needed.           Follow Up   No follow-ups on file.  Patient was given instructions and counseling regarding his condition or for health maintenance advice. Please see specific information pulled into the AVS if appropriate.            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

## 2025-05-19 NOTE — OB PROVIDER TRIAGE NOTE - NSHPPHYSICALEXAM_GEN_ALL_CORE
Vital Signs Last 24 Hrs  T(C): 36.8 (19 May 2025 20:42), Max: 36.8 (19 May 2025 20:42)  T(F): 98.2 (19 May 2025 20:42), Max: 98.2 (19 May 2025 20:42)  HR: 102 (19 May 2025 20:42) (102 - 102)  BP: 108/55 (19 May 2025 20:42) (108/55 - 108/55)  BP(mean): --  RR: 17 (19 May 2025 20:42) (17 - 17)  SpO2: --    General: AAOx3, NAD  Abdomen: Soft nontender, gravid. No palpable contractions  Spec: No pooling, no abnormal discharge, no bleeding  SVE: 0/0/-3  BSS: vtx, posterior placenta, mvp 6.53cm, BPP 8/8

## 2025-05-19 NOTE — OB PROVIDER TRIAGE NOTE - NSOBPROVIDERNOTE_OBGYN_ALL_OB_FT
30 y/o  at 32w, r/o  labor.     - IVF hydration  - f/u GBS   - cont EFM/toco  - reevaluate 28 y/o  at 32w, r/o  labor.     - IVF hydration  - f/u GBS   - f/u UA, UCx  - cont EFM/toco  - reevaluate    Addendum:  Patient feeling improved after IVF hydration. No contractions visualized on monitor. PTL precautions discussed. Has f/u scheduled with Dr. Fletcher this Friday. Encouraged PO hydration.

## 2025-05-20 PROBLEM — Z78.9 OTHER SPECIFIED HEALTH STATUS: Chronic | Status: INACTIVE | Noted: 2019-07-26 | Resolved: 2025-05-19

## 2025-05-20 LAB
APPEARANCE UR: CLEAR — SIGNIFICANT CHANGE UP
BILIRUB UR-MCNC: NEGATIVE — SIGNIFICANT CHANGE UP
COLOR SPEC: YELLOW — SIGNIFICANT CHANGE UP
DIFF PNL FLD: NEGATIVE — SIGNIFICANT CHANGE UP
GLUCOSE UR QL: 250 MG/DL
KETONES UR QL: NEGATIVE MG/DL — SIGNIFICANT CHANGE UP
LEUKOCYTE ESTERASE UR-ACNC: ABNORMAL
NITRITE UR-MCNC: NEGATIVE — SIGNIFICANT CHANGE UP
PH UR: 7 — SIGNIFICANT CHANGE UP (ref 5–8)
PROT UR-MCNC: NEGATIVE MG/DL — SIGNIFICANT CHANGE UP
SP GR SPEC: 1.01 — SIGNIFICANT CHANGE UP (ref 1–1.03)
UROBILINOGEN FLD QL: 0.2 MG/DL — SIGNIFICANT CHANGE UP (ref 0.2–1)

## 2025-05-21 DIAGNOSIS — O24.419 GESTATIONAL DIABETES MELLITUS IN PREGNANCY, UNSPECIFIED CONTROL: ICD-10-CM

## 2025-05-21 DIAGNOSIS — O47.03 FALSE LABOR BEFORE 37 COMPLETED WEEKS OF GESTATION, THIRD TRIMESTER: ICD-10-CM

## 2025-05-21 DIAGNOSIS — Z3A.32 32 WEEKS GESTATION OF PREGNANCY: ICD-10-CM

## 2025-05-21 DIAGNOSIS — R10.30 LOWER ABDOMINAL PAIN, UNSPECIFIED: ICD-10-CM

## 2025-05-21 DIAGNOSIS — O26.893 OTHER SPECIFIED PREGNANCY RELATED CONDITIONS, THIRD TRIMESTER: ICD-10-CM

## 2025-05-21 LAB
CULTURE RESULTS: SIGNIFICANT CHANGE UP
GROUP B BETA STREP DNA (PCR): SIGNIFICANT CHANGE UP
SOURCE GROUP B STREP: SIGNIFICANT CHANGE UP
SPECIMEN SOURCE: SIGNIFICANT CHANGE UP

## 2025-05-23 ENCOUNTER — ASOB RESULT (OUTPATIENT)
Age: 30
End: 2025-05-23

## 2025-05-23 ENCOUNTER — APPOINTMENT (OUTPATIENT)
Dept: ANTEPARTUM | Facility: CLINIC | Age: 30
End: 2025-05-23
Payer: MEDICAID

## 2025-05-23 ENCOUNTER — APPOINTMENT (OUTPATIENT)
Dept: OBGYN | Facility: CLINIC | Age: 30
End: 2025-05-23
Payer: MEDICAID

## 2025-05-23 ENCOUNTER — OUTPATIENT (OUTPATIENT)
Dept: OUTPATIENT SERVICES | Facility: HOSPITAL | Age: 30
LOS: 1 days | End: 2025-05-23
Payer: MEDICAID

## 2025-05-23 ENCOUNTER — NON-APPOINTMENT (OUTPATIENT)
Age: 30
End: 2025-05-23

## 2025-05-23 ENCOUNTER — APPOINTMENT (OUTPATIENT)
Dept: ANTEPARTUM | Facility: CLINIC | Age: 30
End: 2025-05-23

## 2025-05-23 VITALS
DIASTOLIC BLOOD PRESSURE: 70 MMHG | WEIGHT: 134 LBS | SYSTOLIC BLOOD PRESSURE: 111 MMHG | HEIGHT: 59 IN | BODY MASS INDEX: 27.01 KG/M2

## 2025-05-23 VITALS — DIASTOLIC BLOOD PRESSURE: 75 MMHG | SYSTOLIC BLOOD PRESSURE: 113 MMHG

## 2025-05-23 DIAGNOSIS — Z34.90 ENCOUNTER FOR SUPERVISION OF NORMAL PREGNANCY, UNSPECIFIED, UNSPECIFIED TRIMESTER: ICD-10-CM

## 2025-05-23 DIAGNOSIS — O09.90 SUPERVISION OF HIGH RISK PREGNANCY, UNSPECIFIED, UNSPECIFIED TRIMESTER: ICD-10-CM

## 2025-05-23 DIAGNOSIS — Z34.93 ENCOUNTER FOR SUPERVISION OF NORMAL PREGNANCY, UNSPECIFIED, THIRD TRIMESTER: ICD-10-CM

## 2025-05-23 DIAGNOSIS — O99.019 ANEMIA COMPLICATING PREGNANCY, UNSPECIFIED TRIMESTER: ICD-10-CM

## 2025-05-23 PROBLEM — O24.419 GESTATIONAL DIABETES MELLITUS IN PREGNANCY, UNSPECIFIED CONTROL: Chronic | Status: ACTIVE | Noted: 2025-05-19

## 2025-05-23 LAB
BILIRUB UR QL STRIP: NEGATIVE
CLARITY UR: CLEAR
COLLECTION METHOD: NORMAL
GLUCOSE UR-MCNC: NEGATIVE
HCG UR QL: NEGATIVE EU/DL
HGB UR QL STRIP.AUTO: NEGATIVE
KETONES UR-MCNC: NEGATIVE
LEUKOCYTE ESTERASE UR QL STRIP: NORMAL
NITRITE UR QL STRIP: NEGATIVE
PH UR STRIP: 6.5
PROT UR STRIP-MCNC: NEGATIVE
SP GR UR STRIP: 1.01

## 2025-05-23 PROCEDURE — 81002 URINALYSIS NONAUTO W/O SCOPE: CPT

## 2025-05-23 PROCEDURE — 76816 OB US FOLLOW-UP PER FETUS: CPT

## 2025-05-23 PROCEDURE — 99213 OFFICE O/P EST LOW 20 MIN: CPT

## 2025-05-23 PROCEDURE — 76816 OB US FOLLOW-UP PER FETUS: CPT | Mod: 26

## 2025-05-23 PROCEDURE — 99214 OFFICE O/P EST MOD 30 MIN: CPT | Mod: 25

## 2025-05-23 PROCEDURE — 76819 FETAL BIOPHYS PROFIL W/O NST: CPT | Mod: 26,59

## 2025-05-23 PROCEDURE — ZZZZZ: CPT

## 2025-05-23 PROCEDURE — 76819 FETAL BIOPHYS PROFIL W/O NST: CPT

## 2025-05-23 PROCEDURE — 76820 UMBILICAL ARTERY ECHO: CPT | Mod: 26,59

## 2025-05-23 PROCEDURE — 76820 UMBILICAL ARTERY ECHO: CPT

## 2025-05-23 PROCEDURE — 99214 OFFICE O/P EST MOD 30 MIN: CPT

## 2025-05-23 PROCEDURE — 76821 MIDDLE CEREBRAL ARTERY ECHO: CPT

## 2025-05-23 RX ORDER — ASCORBIC ACID 500 MG
500 TABLET ORAL
Qty: 60 | Refills: 1 | Status: ACTIVE | COMMUNITY
Start: 2025-05-23 | End: 1900-01-01

## 2025-05-23 RX ORDER — IRON POLYSACCHARIDE COMPLEX 150 MG
150 CAPSULE ORAL
Qty: 30 | Refills: 1 | Status: ACTIVE | COMMUNITY
Start: 2025-05-23 | End: 1900-01-01

## 2025-05-27 DIAGNOSIS — Z34.93 ENCOUNTER FOR SUPERVISION OF NORMAL PREGNANCY, UNSPECIFIED, THIRD TRIMESTER: ICD-10-CM

## 2025-05-30 DIAGNOSIS — O99.810 ABNORMAL GLUCOSE COMPLICATING PREGNANCY: ICD-10-CM

## 2025-05-30 DIAGNOSIS — Z14.8 GENETIC CARRIER OF OTHER DISEASE: ICD-10-CM

## 2025-05-30 DIAGNOSIS — O36.8390 MATERNAL CARE FOR ABNORMALITIES OF THE FETAL HEART RATE OR RHYTHM, UNSPECIFIED TRIMESTER, NOT APPLICABLE OR UNSPECIFIED: ICD-10-CM

## 2025-05-30 DIAGNOSIS — O35.8XX0 MATERNAL CARE FOR OTHER (SUSPECTED) FETAL ABNORMALITY AND DAMAGE, NOT APPLICABLE OR UNSPECIFIED: ICD-10-CM

## 2025-05-30 DIAGNOSIS — O99.013 ANEMIA COMPLICATING PREGNANCY, THIRD TRIMESTER: ICD-10-CM

## 2025-05-30 DIAGNOSIS — O09.299 SUPERVISION OF PREGNANCY WITH OTHER POOR REPRODUCTIVE OR OBSTETRIC HISTORY, UNSPECIFIED TRIMESTER: ICD-10-CM

## 2025-05-30 DIAGNOSIS — O36.5990 MATERNAL CARE FOR OTHER KNOWN OR SUSPECTED POOR FETAL GROWTH, UNSPECIFIED TRIMESTER, NOT APPLICABLE OR UNSPECIFIED: ICD-10-CM

## 2025-05-30 DIAGNOSIS — Z3A.32 32 WEEKS GESTATION OF PREGNANCY: ICD-10-CM

## 2025-06-06 ENCOUNTER — NON-APPOINTMENT (OUTPATIENT)
Age: 30
End: 2025-06-06

## 2025-06-06 ENCOUNTER — OUTPATIENT (OUTPATIENT)
Dept: OUTPATIENT SERVICES | Facility: HOSPITAL | Age: 30
LOS: 1 days | End: 2025-06-06
Payer: MEDICAID

## 2025-06-06 ENCOUNTER — APPOINTMENT (OUTPATIENT)
Dept: PEDIATRIC CARDIOLOGY | Facility: CLINIC | Age: 30
End: 2025-06-06
Payer: MEDICAID

## 2025-06-06 ENCOUNTER — APPOINTMENT (OUTPATIENT)
Dept: OBGYN | Facility: CLINIC | Age: 30
End: 2025-06-06

## 2025-06-06 VITALS
SYSTOLIC BLOOD PRESSURE: 114 MMHG | DIASTOLIC BLOOD PRESSURE: 78 MMHG | BODY MASS INDEX: 26.82 KG/M2 | WEIGHT: 133.03 LBS | HEIGHT: 59 IN

## 2025-06-06 DIAGNOSIS — Z34.90 ENCOUNTER FOR SUPERVISION OF NORMAL PREGNANCY, UNSPECIFIED, UNSPECIFIED TRIMESTER: ICD-10-CM

## 2025-06-06 PROCEDURE — 81002 URINALYSIS NONAUTO W/O SCOPE: CPT

## 2025-06-06 PROCEDURE — 76827 ECHO EXAM OF FETAL HEART: CPT

## 2025-06-06 PROCEDURE — 99213 OFFICE O/P EST LOW 20 MIN: CPT

## 2025-06-06 PROCEDURE — 99205 OFFICE O/P NEW HI 60 MIN: CPT

## 2025-06-06 PROCEDURE — 76825 ECHO EXAM OF FETAL HEART: CPT

## 2025-06-06 PROCEDURE — 93325 DOPPLER ECHO COLOR FLOW MAPG: CPT

## 2025-06-13 DIAGNOSIS — Z34.90 ENCOUNTER FOR SUPERVISION OF NORMAL PREGNANCY, UNSPECIFIED, UNSPECIFIED TRIMESTER: ICD-10-CM

## 2025-06-16 ENCOUNTER — OUTPATIENT (OUTPATIENT)
Dept: OUTPATIENT SERVICES | Facility: HOSPITAL | Age: 30
LOS: 1 days | End: 2025-06-16
Payer: MEDICAID

## 2025-06-16 DIAGNOSIS — Z34.93 ENCOUNTER FOR SUPERVISION OF NORMAL PREGNANCY, UNSPECIFIED, THIRD TRIMESTER: ICD-10-CM

## 2025-06-16 PROCEDURE — 85025 COMPLETE CBC W/AUTO DIFF WBC: CPT

## 2025-06-16 PROCEDURE — 86780 TREPONEMA PALLIDUM: CPT

## 2025-06-16 PROCEDURE — 87389 HIV-1 AG W/HIV-1&-2 AB AG IA: CPT

## 2025-06-17 DIAGNOSIS — Z34.93 ENCOUNTER FOR SUPERVISION OF NORMAL PREGNANCY, UNSPECIFIED, THIRD TRIMESTER: ICD-10-CM

## 2025-06-20 ENCOUNTER — APPOINTMENT (OUTPATIENT)
Dept: ANTEPARTUM | Facility: CLINIC | Age: 30
End: 2025-06-20
Payer: MEDICAID

## 2025-06-20 ENCOUNTER — OUTPATIENT (OUTPATIENT)
Dept: OUTPATIENT SERVICES | Facility: HOSPITAL | Age: 30
LOS: 1 days | End: 2025-06-20
Payer: MEDICAID

## 2025-06-20 ENCOUNTER — ASOB RESULT (OUTPATIENT)
Age: 30
End: 2025-06-20

## 2025-06-20 DIAGNOSIS — Z34.90 ENCOUNTER FOR SUPERVISION OF NORMAL PREGNANCY, UNSPECIFIED, UNSPECIFIED TRIMESTER: ICD-10-CM

## 2025-06-20 PROCEDURE — 76816 OB US FOLLOW-UP PER FETUS: CPT

## 2025-06-20 PROCEDURE — 76818 FETAL BIOPHYS PROFILE W/NST: CPT | Mod: 26

## 2025-06-20 PROCEDURE — 76821 MIDDLE CEREBRAL ARTERY ECHO: CPT | Mod: 26,59

## 2025-06-20 PROCEDURE — 76820 UMBILICAL ARTERY ECHO: CPT | Mod: 26,59

## 2025-06-20 PROCEDURE — 76820 UMBILICAL ARTERY ECHO: CPT

## 2025-06-20 PROCEDURE — 76821 MIDDLE CEREBRAL ARTERY ECHO: CPT

## 2025-06-20 PROCEDURE — 76816 OB US FOLLOW-UP PER FETUS: CPT | Mod: 26

## 2025-06-20 PROCEDURE — 76818 FETAL BIOPHYS PROFILE W/NST: CPT

## 2025-06-25 DIAGNOSIS — O99.810 ABNORMAL GLUCOSE COMPLICATING PREGNANCY: ICD-10-CM

## 2025-06-25 DIAGNOSIS — O36.8390 MATERNAL CARE FOR ABNORMALITIES OF THE FETAL HEART RATE OR RHYTHM, UNSPECIFIED TRIMESTER, NOT APPLICABLE OR UNSPECIFIED: ICD-10-CM

## 2025-06-25 DIAGNOSIS — Z14.8 GENETIC CARRIER OF OTHER DISEASE: ICD-10-CM

## 2025-06-25 DIAGNOSIS — O09.299 SUPERVISION OF PREGNANCY WITH OTHER POOR REPRODUCTIVE OR OBSTETRIC HISTORY, UNSPECIFIED TRIMESTER: ICD-10-CM

## 2025-06-25 DIAGNOSIS — Z3A.36 36 WEEKS GESTATION OF PREGNANCY: ICD-10-CM

## 2025-06-25 DIAGNOSIS — O35.8XX0 MATERNAL CARE FOR OTHER (SUSPECTED) FETAL ABNORMALITY AND DAMAGE, NOT APPLICABLE OR UNSPECIFIED: ICD-10-CM

## 2025-06-25 DIAGNOSIS — O99.013 ANEMIA COMPLICATING PREGNANCY, THIRD TRIMESTER: ICD-10-CM

## 2025-06-25 DIAGNOSIS — O36.5990 MATERNAL CARE FOR OTHER KNOWN OR SUSPECTED POOR FETAL GROWTH, UNSPECIFIED TRIMESTER, NOT APPLICABLE OR UNSPECIFIED: ICD-10-CM

## 2025-06-27 ENCOUNTER — APPOINTMENT (OUTPATIENT)
Dept: OBGYN | Facility: CLINIC | Age: 30
End: 2025-06-27
Payer: MEDICAID

## 2025-06-27 ENCOUNTER — OUTPATIENT (OUTPATIENT)
Dept: OUTPATIENT SERVICES | Facility: HOSPITAL | Age: 30
LOS: 1 days | End: 2025-06-27
Payer: MEDICAID

## 2025-06-27 VITALS
BODY MASS INDEX: 28.04 KG/M2 | DIASTOLIC BLOOD PRESSURE: 70 MMHG | WEIGHT: 139.06 LBS | HEIGHT: 59 IN | SYSTOLIC BLOOD PRESSURE: 104 MMHG

## 2025-06-27 DIAGNOSIS — Z34.90 ENCOUNTER FOR SUPERVISION OF NORMAL PREGNANCY, UNSPECIFIED, UNSPECIFIED TRIMESTER: ICD-10-CM

## 2025-06-27 PROCEDURE — 87653 STREP B DNA AMP PROBE: CPT

## 2025-06-27 PROCEDURE — 87591 N.GONORRHOEAE DNA AMP PROB: CPT

## 2025-06-27 PROCEDURE — 87661 TRICHOMONAS VAGINALIS AMPLIF: CPT

## 2025-06-27 PROCEDURE — 99213 OFFICE O/P EST LOW 20 MIN: CPT

## 2025-06-27 PROCEDURE — 81002 URINALYSIS NONAUTO W/O SCOPE: CPT

## 2025-06-27 PROCEDURE — 87491 CHLMYD TRACH DNA AMP PROBE: CPT

## 2025-06-30 DIAGNOSIS — Z34.93 ENCOUNTER FOR SUPERVISION OF NORMAL PREGNANCY, UNSPECIFIED, THIRD TRIMESTER: ICD-10-CM

## 2025-07-01 ENCOUNTER — OUTPATIENT (OUTPATIENT)
Dept: OUTPATIENT SERVICES | Facility: HOSPITAL | Age: 30
LOS: 1 days | End: 2025-07-01
Payer: MEDICAID

## 2025-07-01 ENCOUNTER — APPOINTMENT (OUTPATIENT)
Dept: OBGYN | Facility: CLINIC | Age: 30
End: 2025-07-01
Payer: MEDICAID

## 2025-07-01 VITALS — DIASTOLIC BLOOD PRESSURE: 73 MMHG | WEIGHT: 141 LBS | BODY MASS INDEX: 28.48 KG/M2 | SYSTOLIC BLOOD PRESSURE: 112 MMHG

## 2025-07-01 DIAGNOSIS — Z34.90 ENCOUNTER FOR SUPERVISION OF NORMAL PREGNANCY, UNSPECIFIED, UNSPECIFIED TRIMESTER: ICD-10-CM

## 2025-07-01 PROCEDURE — 99213 OFFICE O/P EST LOW 20 MIN: CPT

## 2025-07-01 PROCEDURE — 81002 URINALYSIS NONAUTO W/O SCOPE: CPT

## 2025-07-02 DIAGNOSIS — Z34.83 ENCOUNTER FOR SUPERVISION OF OTHER NORMAL PREGNANCY, THIRD TRIMESTER: ICD-10-CM

## 2025-07-02 LAB
BILIRUB UR QL STRIP: NEGATIVE
C TRACH RRNA SPEC QL NAA+PROBE: NOT DETECTED
CLARITY UR: CLEAR
COLLECTION METHOD: NORMAL
GLUCOSE UR-MCNC: NEGATIVE
GP B STREP DNA SPEC QL NAA+PROBE: NOT DETECTED
HCG UR QL: NEGATIVE EU/DL
HGB UR QL STRIP.AUTO: NEGATIVE
KETONES UR-MCNC: NEGATIVE
LEUKOCYTE ESTERASE UR QL STRIP: NEGATIVE
N GONORRHOEA RRNA SPEC QL NAA+PROBE: NOT DETECTED
NITRITE UR QL STRIP: NEGATIVE
PH UR STRIP: 6
PROT UR STRIP-MCNC: NORMAL
SOURCE AMPLIFICATION: NORMAL
SOURCE GBS: NORMAL
SP GR UR STRIP: 1.02
T VAGINALIS RRNA SPEC QL NAA+PROBE: NOT DETECTED

## 2025-07-09 ENCOUNTER — INPATIENT (INPATIENT)
Facility: HOSPITAL | Age: 30
LOS: 1 days | Discharge: HOME | DRG: 560 | End: 2025-07-11
Attending: OBSTETRICS & GYNECOLOGY | Admitting: OBSTETRICS & GYNECOLOGY
Payer: MEDICAID

## 2025-07-09 VITALS — SYSTOLIC BLOOD PRESSURE: 118 MMHG | DIASTOLIC BLOOD PRESSURE: 68 MMHG | HEART RATE: 66 BPM

## 2025-07-09 DIAGNOSIS — Z00.00 ENCOUNTER FOR GENERAL ADULT MEDICAL EXAMINATION WITHOUT ABNORMAL FINDINGS: ICD-10-CM

## 2025-07-09 DIAGNOSIS — O26.893 OTHER SPECIFIED PREGNANCY RELATED CONDITIONS, THIRD TRIMESTER: ICD-10-CM

## 2025-07-09 LAB
APPEARANCE UR: CLEAR — SIGNIFICANT CHANGE UP
BASOPHILS # BLD AUTO: 0.03 K/UL — SIGNIFICANT CHANGE UP (ref 0–0.2)
BASOPHILS NFR BLD AUTO: 0.4 % — SIGNIFICANT CHANGE UP (ref 0–1)
BILIRUB UR-MCNC: NEGATIVE — SIGNIFICANT CHANGE UP
BLD GP AB SCN SERPL QL: SIGNIFICANT CHANGE UP
COLOR SPEC: YELLOW — SIGNIFICANT CHANGE UP
DIFF PNL FLD: NEGATIVE — SIGNIFICANT CHANGE UP
EOSINOPHIL # BLD AUTO: 0.09 K/UL — SIGNIFICANT CHANGE UP (ref 0–0.7)
EOSINOPHIL NFR BLD AUTO: 1.2 % — SIGNIFICANT CHANGE UP (ref 0–8)
GLUCOSE UR QL: NEGATIVE MG/DL — SIGNIFICANT CHANGE UP
HCT VFR BLD CALC: 33.9 % — LOW (ref 37–47)
HGB BLD-MCNC: 11.4 G/DL — LOW (ref 12–16)
IMM GRANULOCYTES NFR BLD AUTO: 0.3 % — SIGNIFICANT CHANGE UP (ref 0.1–0.3)
KETONES UR QL: NEGATIVE MG/DL — SIGNIFICANT CHANGE UP
L&D DRUG SCREEN, URINE: SIGNIFICANT CHANGE UP
LEUKOCYTE ESTERASE UR-ACNC: ABNORMAL
LYMPHOCYTES # BLD AUTO: 1.9 K/UL — SIGNIFICANT CHANGE UP (ref 1.2–3.4)
LYMPHOCYTES # BLD AUTO: 24.4 % — SIGNIFICANT CHANGE UP (ref 20.5–51.1)
MCHC RBC-ENTMCNC: 29.5 PG — SIGNIFICANT CHANGE UP (ref 27–31)
MCHC RBC-ENTMCNC: 33.6 G/DL — SIGNIFICANT CHANGE UP (ref 32–37)
MCV RBC AUTO: 87.8 FL — SIGNIFICANT CHANGE UP (ref 81–99)
MONOCYTES # BLD AUTO: 0.44 K/UL — SIGNIFICANT CHANGE UP (ref 0.1–0.6)
MONOCYTES NFR BLD AUTO: 5.7 % — SIGNIFICANT CHANGE UP (ref 1.7–9.3)
NEUTROPHILS # BLD AUTO: 5.3 K/UL — SIGNIFICANT CHANGE UP (ref 1.4–6.5)
NEUTROPHILS NFR BLD AUTO: 68 % — SIGNIFICANT CHANGE UP (ref 42.2–75.2)
NITRITE UR-MCNC: NEGATIVE — SIGNIFICANT CHANGE UP
NRBC BLD AUTO-RTO: 0 /100 WBCS — SIGNIFICANT CHANGE UP (ref 0–0)
PH UR: 6.5 — SIGNIFICANT CHANGE UP (ref 5–8)
PLATELET # BLD AUTO: 136 K/UL — SIGNIFICANT CHANGE UP (ref 130–400)
PMV BLD: 13.3 FL — HIGH (ref 7.4–10.4)
PRENATAL SYPHILIS TEST: SIGNIFICANT CHANGE UP
PROT UR-MCNC: 30 MG/DL
RBC # BLD: 3.86 M/UL — LOW (ref 4.2–5.4)
RBC # FLD: 12.4 % — SIGNIFICANT CHANGE UP (ref 11.5–14.5)
SP GR SPEC: 1.02 — SIGNIFICANT CHANGE UP (ref 1–1.03)
UROBILINOGEN FLD QL: 0.2 MG/DL — SIGNIFICANT CHANGE UP (ref 0.2–1)
WBC # BLD: 7.78 K/UL — SIGNIFICANT CHANGE UP (ref 4.8–10.8)
WBC # FLD AUTO: 7.78 K/UL — SIGNIFICANT CHANGE UP (ref 4.8–10.8)

## 2025-07-09 PROCEDURE — 86900 BLOOD TYPING SEROLOGIC ABO: CPT

## 2025-07-09 PROCEDURE — 36415 COLL VENOUS BLD VENIPUNCTURE: CPT

## 2025-07-09 PROCEDURE — 81001 URINALYSIS AUTO W/SCOPE: CPT

## 2025-07-09 PROCEDURE — 59050 FETAL MONITOR W/REPORT: CPT

## 2025-07-09 PROCEDURE — 86850 RBC ANTIBODY SCREEN: CPT

## 2025-07-09 PROCEDURE — 80354 DRUG SCREENING FENTANYL: CPT

## 2025-07-09 PROCEDURE — 86901 BLOOD TYPING SEROLOGIC RH(D): CPT

## 2025-07-09 PROCEDURE — 86592 SYPHILIS TEST NON-TREP QUAL: CPT

## 2025-07-09 PROCEDURE — 85025 COMPLETE CBC W/AUTO DIFF WBC: CPT

## 2025-07-09 PROCEDURE — 80307 DRUG TEST PRSMV CHEM ANLYZR: CPT

## 2025-07-09 RX ORDER — HYDROCORTISONE 10 MG/G
1 CREAM TOPICAL EVERY 6 HOURS
Refills: 0 | Status: DISCONTINUED | OUTPATIENT
Start: 2025-07-09 | End: 2025-07-09

## 2025-07-09 RX ORDER — SODIUM CHLORIDE 9 G/1000ML
1000 INJECTION, SOLUTION INTRAVENOUS
Refills: 0 | Status: DISCONTINUED | OUTPATIENT
Start: 2025-07-09 | End: 2025-07-09

## 2025-07-09 RX ORDER — SIMETHICONE 80 MG
80 TABLET,CHEWABLE ORAL EVERY 4 HOURS
Refills: 0 | Status: DISCONTINUED | OUTPATIENT
Start: 2025-07-09 | End: 2025-07-09

## 2025-07-09 RX ORDER — TRANEXAMIC ACID 1000 MG/10
1000 AMPUL (ML) INTRAVENOUS ONCE
Refills: 0 | Status: DISCONTINUED | OUTPATIENT
Start: 2025-07-09 | End: 2025-07-11

## 2025-07-09 RX ORDER — MAGNESIUM HYDROXIDE 400 MG/5ML
30 SUSPENSION ORAL
Refills: 0 | Status: DISCONTINUED | OUTPATIENT
Start: 2025-07-09 | End: 2025-07-09

## 2025-07-09 RX ORDER — DIBUCAINE 10 MG/G
1 OINTMENT TOPICAL EVERY 6 HOURS
Refills: 0 | Status: DISCONTINUED | OUTPATIENT
Start: 2025-07-09 | End: 2025-07-09

## 2025-07-09 RX ORDER — OXYTOCIN-SODIUM CHLORIDE 0.9% IV SOLN 30 UNIT/500ML 30-0.9/5 UT/ML-%
167 SOLUTION INTRAVENOUS
Qty: 30 | Refills: 0 | Status: COMPLETED | OUTPATIENT
Start: 2025-07-09 | End: 2025-07-10

## 2025-07-09 RX ORDER — DIPHENHYDRAMINE HCL 12.5MG/5ML
25 ELIXIR ORAL EVERY 6 HOURS
Refills: 0 | Status: DISCONTINUED | OUTPATIENT
Start: 2025-07-09 | End: 2025-07-09

## 2025-07-09 RX ORDER — OXYCODONE HYDROCHLORIDE 30 MG/1
5 TABLET ORAL
Refills: 0 | Status: DISCONTINUED | OUTPATIENT
Start: 2025-07-09 | End: 2025-07-09

## 2025-07-09 RX ORDER — IBUPROFEN 200 MG
600 TABLET ORAL EVERY 6 HOURS
Refills: 0 | Status: DISCONTINUED | OUTPATIENT
Start: 2025-07-09 | End: 2025-07-09

## 2025-07-09 RX ORDER — OXYTOCIN-SODIUM CHLORIDE 0.9% IV SOLN 30 UNIT/500ML 30-0.9/5 UT/ML-%
167 SOLUTION INTRAVENOUS
Qty: 30 | Refills: 0 | Status: DISCONTINUED | OUTPATIENT
Start: 2025-07-09 | End: 2025-07-09

## 2025-07-09 RX ORDER — CARBOPROST TROMETHAMINE 250 UG/ML
250 INJECTION, SOLUTION INTRAMUSCULAR ONCE
Refills: 0 | Status: DISCONTINUED | OUTPATIENT
Start: 2025-07-09 | End: 2025-07-11

## 2025-07-09 RX ORDER — BENZOCAINE 220 MG/G
1 SPRAY, METERED PERIODONTAL EVERY 6 HOURS
Refills: 0 | Status: DISCONTINUED | OUTPATIENT
Start: 2025-07-09 | End: 2025-07-09

## 2025-07-09 RX ORDER — LIDOCAINE HCL/PF 10 MG/ML
30 VIAL (ML) INJECTION ONCE
Refills: 0 | Status: DISCONTINUED | OUTPATIENT
Start: 2025-07-09 | End: 2025-07-09

## 2025-07-09 RX ORDER — KETOROLAC TROMETHAMINE 30 MG/ML
30 INJECTION, SOLUTION INTRAMUSCULAR; INTRAVENOUS ONCE
Refills: 0 | Status: DISCONTINUED | OUTPATIENT
Start: 2025-07-09 | End: 2025-07-09

## 2025-07-09 RX ORDER — OXYTOCIN-SODIUM CHLORIDE 0.9% IV SOLN 30 UNIT/500ML 30-0.9/5 UT/ML-%
2 SOLUTION INTRAVENOUS
Qty: 30 | Refills: 0 | Status: DISCONTINUED | OUTPATIENT
Start: 2025-07-09 | End: 2025-07-09

## 2025-07-09 RX ORDER — OXYCODONE HYDROCHLORIDE 30 MG/1
5 TABLET ORAL ONCE
Refills: 0 | Status: DISCONTINUED | OUTPATIENT
Start: 2025-07-09 | End: 2025-07-09

## 2025-07-09 RX ORDER — PRENATAL 136/IRON/FOLIC ACID 27 MG-1 MG
1 TABLET ORAL DAILY
Refills: 0 | Status: DISCONTINUED | OUTPATIENT
Start: 2025-07-09 | End: 2025-07-09

## 2025-07-09 RX ORDER — LIDOCAINE HCL/PF 10 MG/ML
30 VIAL (ML) INJECTION ONCE
Refills: 0 | Status: DISCONTINUED | OUTPATIENT
Start: 2025-07-09 | End: 2025-07-10

## 2025-07-09 RX ORDER — ACETAMINOPHEN 500 MG/5ML
975 LIQUID (ML) ORAL
Refills: 0 | Status: DISCONTINUED | OUTPATIENT
Start: 2025-07-09 | End: 2025-07-10

## 2025-07-09 RX ORDER — PRAMOXINE HCL 1 %
1 GEL (GRAM) TOPICAL EVERY 4 HOURS
Refills: 0 | Status: DISCONTINUED | OUTPATIENT
Start: 2025-07-09 | End: 2025-07-09

## 2025-07-09 RX ORDER — WITCH HAZEL LEAF
1 FLUID EXTRACT MISCELLANEOUS EVERY 4 HOURS
Refills: 0 | Status: DISCONTINUED | OUTPATIENT
Start: 2025-07-09 | End: 2025-07-09

## 2025-07-09 RX ORDER — SODIUM CHLORIDE 9 G/1000ML
1000 INJECTION, SOLUTION INTRAVENOUS
Refills: 0 | Status: DISCONTINUED | OUTPATIENT
Start: 2025-07-09 | End: 2025-07-10

## 2025-07-09 RX ORDER — MODIFIED LANOLIN 100 %
1 CREAM (GRAM) TOPICAL EVERY 6 HOURS
Refills: 0 | Status: DISCONTINUED | OUTPATIENT
Start: 2025-07-09 | End: 2025-07-09

## 2025-07-09 RX ADMIN — SODIUM CHLORIDE 125 MILLILITER(S): 9 INJECTION, SOLUTION INTRAVENOUS at 13:24

## 2025-07-09 NOTE — OB RN DELIVERY SUMMARY - NSSELHIDDEN_OBGYN_ALL_OB_FT
[[email or web address]_OB_FT:[email or web address]],[NS_DeliveryAssist2_OBGYN_ALL_OB_FT:[email or web address]=],[NS_DeliveryRN_OBGYN_ALL_OB_FT:[id number]=],[NS_CirculateRN2_OBGYN_ALL_OB_FT:[id number]]

## 2025-07-09 NOTE — OB PROVIDER H&P - ATTENDING COMMENTS
30 y/o  @ 39w2d here for IOL due to IUGR with normal dopplers. Admit to labor and delivery. Cervical ripening with cytotec. Continuous EFM and toco. Draw labs. Pain management PRN.

## 2025-07-09 NOTE — OB PROVIDER TRIAGE NOTE - LABOR: CERVICAL CONSISTENCY
soft

## 2025-07-09 NOTE — OB PROVIDER TRIAGE NOTE - NSHPLABSRESULTS_GEN_ALL_CORE
1/4/25  O pos  antibody neg  HBsAg neg  HIV neg  RPR neg  HCV neg  rubella immune  varicella immune      GTT 87/173/130/107    6/16/25  HIV NR  trep neg    6/27/25  GBS neg    Sono  36w4d vtx, posterior no previa, MVP 7.41cm, EFW 2424g 8%ile  32w4d EFW 1681g (7%ile), normal dopplers   28w1d EFW 1073g (15%ile), normal dopplers  24w1d EFW 575g (10%ile), normal dopplers, no echogenic material in the stomach  20w0d EFW 3%ile, normal dopplers, echogenic material in the stomach  14w0d NT 1.6mm wnl

## 2025-07-09 NOTE — OB RN PATIENT PROFILE - NSMATERNALFETALCONCERNS_OBGYN_ALL_OB_FT
Fetal Alert  6.20.25- Fetal echo 25 due to concern for irregular heart rhythm.Normal anatomy. Fetal heart rhythm during exam: sinus rhythm with occasional premature atrial contractions. Recommend  EKG.Venita Soto RN.

## 2025-07-09 NOTE — OB PROVIDER TRIAGE NOTE - NSLASTDATEVISIT_OBGYN_ALL_OB
SOAP note objective information for 10/11/2018.  ROM:  Pain Report:  - none    + mild    ++ moderate    +++ severe     Wrist  1/11/2018 7/12/18 8/13/18 8/25/18 10/11/18   AROM (PROM) R L  R     Extension  After Tx 45 + 60 38++ 45 42+ 45++  47++   Flexion 50 + 65 20+ 37 35+ 29++   RD 10 20 12+ 10+ 11+ 11++   UD 30 30 18++ 22++ 28+ 28+   Supination 60 90 52++ 65 71+ 67   Pronation 70 85 90 75 88+ 85   Please refer to the daily flowsheet for treatment today, total treatment time and time spent performing 1:1 timed codes.       Home Exercise Program:  OTC wrist orthosis  Tendon glides  Wrist AROM  Ice  Heat  Massage to extensors and flexors  Functional use, as much as able    Next Visit:  US  Ktape?  Wrist PROM  Functional ax  MFR  Strengthening    
Unknown

## 2025-07-09 NOTE — OB PROVIDER TRIAGE NOTE - NSHPPHYSICALEXAM_GEN_ALL_CORE
T(C): 36.3 (07-09-25 @ 11:04), Max: 36.3 (07-09-25 @ 11:04)  HR: 66 (07-09-25 @ 11:04) (66 - 66)  BP: 118/68 (07-09-25 @ 11:04) (118/68 - 118/68)  RR: 18 (07-09-25 @ 11:04) (18 - 18)  BMI (kg/m2): 28.3 (07-09-25 @ 11:04)    Gen: A+OX3. NAD  Abd: Soft, Nontender. Gravid.  SVE: 0/0/-3   FHR: 140/mod/+accels  TOCO: irr

## 2025-07-09 NOTE — OB PROVIDER TRIAGE NOTE - HISTORY OF PRESENT ILLNESS
28 y/o  at 39w2d, TERRELL 25 by LMP c/w early sono presents for scheduled induction of labor at term. Denies [id number], vaginal bleeding, leakage of fluid. Endorses good fetal movements. GBS neg.     Pregnancy complicated by:   #IUGR w/ normal dopplers, last EFW 8%ile, AC 20%ile at 36w4d  #anemia on PO iron, admission cbc 11.4  #echogenic focus in stomach, CMV IgG pos, IgM neg  #h/o GDMA1 in P1, current pregnancy elevated GCT, normal GTT. 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

## 2025-07-09 NOTE — OB RN PATIENT PROFILE - NS_RSVSEASON_OBGYN_ALL_OB
"Oncology Rooming Note    July 3, 2025 2:07 PM   Berto Pretty is a 71 year old male who presents for:    Chief Complaint   Patient presents with    Oncology Clinic Visit     1 year follow up labs     Initial Vitals: There were no vitals taken for this visit. Estimated body mass index is 21.63 kg/m  as calculated from the following:    Height as of 5/22/25: 1.626 m (5' 4\").    Weight as of 5/22/25: 57.2 kg (126 lb). There is no height or weight on file to calculate BSA.  Data Unavailable Comment: Data Unavailable   No LMP for male patient.  Allergies reviewed: Yes  Medications reviewed: Yes    Medications: Medication refills not needed today.  Pharmacy name entered into mon[facility]: [id number] - [place], [place] - [address] [place] [address] E    Frailty Screening:   Is the patient here for a new oncology consult visit in cancer care? 2. No    PHQ9:  Did this patient require a PHQ9?: No      Clinical concerns: none       Amy Arroyo, NEETU            " No

## 2025-07-09 NOTE — OB PROVIDER H&P - HISTORY OF PRESENT ILLNESS
28 y/o  at 39w2d, TERRELL 25 by LMP c/w early sono presents for scheduled induction of labor at term. Denies [id number], vaginal bleeding, leakage of fluid. Endorses good fetal movements. GBS neg.     Pregnancy complicated by:   #IUGR w/ normal dopplers, last EFW 8%ile, AC 20%ile at 36w4d  #anemia on PO iron, admission cbc 11.4  #echogenic focus in stomach, CMV IgG pos, IgM neg  #h/o GDMA1 in P1, current pregnancy elevated GCT, normal GTT. 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

## 2025-07-09 NOTE — OB PROVIDER H&P - ASSESSMENT
28yo  at 39w2d, GBS neg, IOL, IUGR w/ normal dopplers.     -Admit to L+D  -Monitor EFM and TOCO   -IVF and labs  -Pain control PRN  -Clear liquid diet as tolerated  -Monitor vitals  -for cytotec

## 2025-07-10 LAB
BASOPHILS # BLD AUTO: 0.02 K/UL — SIGNIFICANT CHANGE UP (ref 0–0.2)
BASOPHILS NFR BLD AUTO: 0.2 % — SIGNIFICANT CHANGE UP (ref 0–1)
EOSINOPHIL # BLD AUTO: 0.03 K/UL — SIGNIFICANT CHANGE UP (ref 0–0.7)
EOSINOPHIL NFR BLD AUTO: 0.3 % — SIGNIFICANT CHANGE UP (ref 0–8)
HCT VFR BLD CALC: 32.9 % — LOW (ref 37–47)
HGB BLD-MCNC: 11.2 G/DL — LOW (ref 12–16)
IMM GRANULOCYTES NFR BLD AUTO: 0.4 % — HIGH (ref 0.1–0.3)
LYMPHOCYTES # BLD AUTO: 1.4 K/UL — SIGNIFICANT CHANGE UP (ref 1.2–3.4)
LYMPHOCYTES # BLD AUTO: 12.8 % — LOW (ref 20.5–51.1)
MCHC RBC-ENTMCNC: 29.9 PG — SIGNIFICANT CHANGE UP (ref 27–31)
MCHC RBC-ENTMCNC: 34 G/DL — SIGNIFICANT CHANGE UP (ref 32–37)
MCV RBC AUTO: 88 FL — SIGNIFICANT CHANGE UP (ref 81–99)
MONOCYTES # BLD AUTO: 0.52 K/UL — SIGNIFICANT CHANGE UP (ref 0.1–0.6)
MONOCYTES NFR BLD AUTO: 4.7 % — SIGNIFICANT CHANGE UP (ref 1.7–9.3)
NEUTROPHILS # BLD AUTO: 8.96 K/UL — HIGH (ref 1.4–6.5)
NEUTROPHILS NFR BLD AUTO: 81.6 % — HIGH (ref 42.2–75.2)
NRBC BLD AUTO-RTO: 0 /100 WBCS — SIGNIFICANT CHANGE UP (ref 0–0)
PLATELET # BLD AUTO: 134 K/UL — SIGNIFICANT CHANGE UP (ref 130–400)
PMV BLD: 13.2 FL — HIGH (ref 7.4–10.4)
RBC # BLD: 3.74 M/UL — LOW (ref 4.2–5.4)
RBC # FLD: 12.5 % — SIGNIFICANT CHANGE UP (ref 11.5–14.5)
WBC # BLD: 10.97 K/UL — HIGH (ref 4.8–10.8)
WBC # FLD AUTO: 10.97 K/UL — HIGH (ref 4.8–10.8)

## 2025-07-10 PROCEDURE — 59409 OBSTETRICAL CARE: CPT | Mod: U9

## 2025-07-10 RX ORDER — PRENATAL 136/IRON/FOLIC ACID 27 MG-1 MG
1 TABLET ORAL DAILY
Refills: 0 | Status: DISCONTINUED | OUTPATIENT
Start: 2025-07-10 | End: 2025-07-11

## 2025-07-10 RX ORDER — IBUPROFEN 200 MG
600 TABLET ORAL EVERY 6 HOURS
Refills: 0 | Status: DISCONTINUED | OUTPATIENT
Start: 2025-07-10 | End: 2025-07-11

## 2025-07-10 RX ORDER — WITCH HAZEL LEAF
1 FLUID EXTRACT MISCELLANEOUS EVERY 4 HOURS
Refills: 0 | Status: DISCONTINUED | OUTPATIENT
Start: 2025-07-10 | End: 2025-07-11

## 2025-07-10 RX ORDER — OXYTOCIN-SODIUM CHLORIDE 0.9% IV SOLN 30 UNIT/500ML 30-0.9/5 UT/ML-%
167 SOLUTION INTRAVENOUS
Qty: 30 | Refills: 0 | Status: DISCONTINUED | OUTPATIENT
Start: 2025-07-10 | End: 2025-07-11

## 2025-07-10 RX ORDER — SIMETHICONE 80 MG
80 TABLET,CHEWABLE ORAL EVERY 4 HOURS
Refills: 0 | Status: DISCONTINUED | OUTPATIENT
Start: 2025-07-10 | End: 2025-07-11

## 2025-07-10 RX ORDER — ACETAMINOPHEN 500 MG/5ML
975 LIQUID (ML) ORAL
Refills: 0 | Status: DISCONTINUED | OUTPATIENT
Start: 2025-07-10 | End: 2025-07-11

## 2025-07-10 RX ORDER — NALOXONE HYDROCHLORIDE 0.4 MG/ML
0.1 INJECTION, SOLUTION INTRAMUSCULAR; INTRAVENOUS; SUBCUTANEOUS
Refills: 0 | Status: DISCONTINUED | OUTPATIENT
Start: 2025-07-10 | End: 2025-07-10

## 2025-07-10 RX ORDER — OXYTOCIN-SODIUM CHLORIDE 0.9% IV SOLN 30 UNIT/500ML 30-0.9/5 UT/ML-%
SOLUTION INTRAVENOUS
Qty: 30 | Refills: 0 | Status: DISCONTINUED | OUTPATIENT
Start: 2025-07-10 | End: 2025-07-10

## 2025-07-10 RX ORDER — OXYCODONE HYDROCHLORIDE 30 MG/1
5 TABLET ORAL
Refills: 0 | Status: DISCONTINUED | OUTPATIENT
Start: 2025-07-10 | End: 2025-07-11

## 2025-07-10 RX ORDER — BENZOCAINE 220 MG/G
1 SPRAY, METERED PERIODONTAL EVERY 6 HOURS
Refills: 0 | Status: DISCONTINUED | OUTPATIENT
Start: 2025-07-10 | End: 2025-07-11

## 2025-07-10 RX ORDER — MAGNESIUM HYDROXIDE 400 MG/5ML
30 SUSPENSION ORAL
Refills: 0 | Status: DISCONTINUED | OUTPATIENT
Start: 2025-07-10 | End: 2025-07-11

## 2025-07-10 RX ORDER — DIBUCAINE 10 MG/G
1 OINTMENT TOPICAL EVERY 6 HOURS
Refills: 0 | Status: DISCONTINUED | OUTPATIENT
Start: 2025-07-10 | End: 2025-07-11

## 2025-07-10 RX ORDER — DIPHENHYDRAMINE HCL 12.5MG/5ML
25 ELIXIR ORAL EVERY 6 HOURS
Refills: 0 | Status: DISCONTINUED | OUTPATIENT
Start: 2025-07-10 | End: 2025-07-11

## 2025-07-10 RX ORDER — IBUPROFEN 200 MG
600 TABLET ORAL EVERY 6 HOURS
Refills: 0 | Status: COMPLETED | OUTPATIENT
Start: 2025-07-10 | End: 2026-06-08

## 2025-07-10 RX ORDER — HYDROCORTISONE 10 MG/G
1 CREAM TOPICAL EVERY 6 HOURS
Refills: 0 | Status: DISCONTINUED | OUTPATIENT
Start: 2025-07-10 | End: 2025-07-11

## 2025-07-10 RX ORDER — MODIFIED LANOLIN 100 %
1 CREAM (GRAM) TOPICAL EVERY 6 HOURS
Refills: 0 | Status: DISCONTINUED | OUTPATIENT
Start: 2025-07-10 | End: 2025-07-11

## 2025-07-10 RX ORDER — OXYCODONE HYDROCHLORIDE 30 MG/1
5 TABLET ORAL ONCE
Refills: 0 | Status: DISCONTINUED | OUTPATIENT
Start: 2025-07-10 | End: 2025-07-11

## 2025-07-10 RX ORDER — KETOROLAC TROMETHAMINE 30 MG/ML
30 INJECTION, SOLUTION INTRAMUSCULAR; INTRAVENOUS ONCE
Refills: 0 | Status: DISCONTINUED | OUTPATIENT
Start: 2025-07-10 | End: 2025-07-11

## 2025-07-10 RX ORDER — PRAMOXINE HCL 1 %
1 GEL (GRAM) TOPICAL EVERY 4 HOURS
Refills: 0 | Status: DISCONTINUED | OUTPATIENT
Start: 2025-07-10 | End: 2025-07-11

## 2025-07-10 RX ORDER — FENTANYL/BUPIVACAINE/NS/PF 2MCG/ML-.1
250 PLASTIC BAG, INJECTION (ML) INJECTION
Refills: 0 | Status: DISCONTINUED | OUTPATIENT
Start: 2025-07-10 | End: 2025-07-10

## 2025-07-10 RX ORDER — DEXAMETHASONE 0.5 MG/1
4 TABLET ORAL EVERY 6 HOURS
Refills: 0 | Status: DISCONTINUED | OUTPATIENT
Start: 2025-07-10 | End: 2025-07-10

## 2025-07-10 RX ORDER — ONDANSETRON HCL/PF 4 MG/2 ML
4 VIAL (ML) INJECTION EVERY 6 HOURS
Refills: 0 | Status: DISCONTINUED | OUTPATIENT
Start: 2025-07-10 | End: 2025-07-10

## 2025-07-10 RX ADMIN — Medication 600 MILLIGRAM(S): at 18:29

## 2025-07-10 RX ADMIN — Medication 975 MILLIGRAM(S): at 21:33

## 2025-07-10 RX ADMIN — OXYTOCIN-SODIUM CHLORIDE 0.9% IV SOLN 30 UNIT/500ML 167 MILLIUNIT(S)/MIN: 30-0.9/5 SOLUTION at 04:06

## 2025-07-10 RX ADMIN — Medication 975 MILLIGRAM(S): at 09:34

## 2025-07-10 RX ADMIN — Medication 3 MILLILITER(S): at 22:10

## 2025-07-10 RX ADMIN — Medication 3 MILLILITER(S): at 18:15

## 2025-07-10 RX ADMIN — Medication 600 MILLIGRAM(S): at 17:59

## 2025-07-10 RX ADMIN — Medication 600 MILLIGRAM(S): at 23:56

## 2025-07-10 RX ADMIN — Medication 1 TABLET(S): at 13:02

## 2025-07-10 RX ADMIN — Medication 3 MILLILITER(S): at 06:36

## 2025-07-10 RX ADMIN — Medication 975 MILLIGRAM(S): at 21:03

## 2025-07-10 RX ADMIN — Medication 975 MILLIGRAM(S): at 16:24

## 2025-07-10 RX ADMIN — Medication 975 MILLIGRAM(S): at 15:54

## 2025-07-10 RX ADMIN — Medication 600 MILLIGRAM(S): at 13:01

## 2025-07-10 RX ADMIN — Medication 600 MILLIGRAM(S): at 13:31

## 2025-07-10 RX ADMIN — Medication 975 MILLIGRAM(S): at 09:04

## 2025-07-10 NOTE — OB PROVIDER LABOR PROGRESS NOTE - NS_SUBJECTIVE/OBJECTIVE_OBGYN_ALL_OB_FT
PGY1 Progress note  Patient evaluated at bedside, doing well. She reprots a gush of fluid and believes her water broke.    Vital Signs Last 24 Hrs  T(C): 36.8 (2025 19:40), Max: 36.8 (2025 16:23)  T(F): 98.24 (2025 16:23), Max: 98.24 (2025 16:23)  HR: 69 (2025 19:40) (64 - 80)  BP: 106/68 (2025 19:40) (106/68 - 124/64)  RR: 16 (2025 19:40) (16 - 18)  SpO2: 100% (10 Jul 2025 01:17) (100% - 100%)    Parameters below as of 2025 11:04  Patient On (Oxygen Delivery Method): room air    SVE: SROM clear @1255    Labs:                        11.4   7.78  )-----------( 136      ( 2025 11:40 )             33.9        ABO RH Interpretation: O POS (25 @ 11:27)    Urinalysis Basic - ( 2025 11:40 )    Color: Yellow / Appearance: Clear / S.019 / pH: x  Gluc: x / Ketone: x  / Bili: Negative / Urobili: 0.2 mg/dL   Blood: x / Protein: 30 mg/dL / Nitrite: Negative   Leuk Esterase: Moderate / RBC: 1 /HPF / WBC 25 /HPF   Sq Epi: x / Non Sq Epi: 10 /HPF / Bacteria: Few /HPF    Prenatal Syphilis Test: Nonreact (25 @ 11:40) PGY1 Progress note  Patient evaluated at bedside, doing well. She reports a gush of fluid and believes her water broke. Pitocin @4u    Vital Signs Last 24 Hrs  T(C): 36.8 (2025 19:40), Max: 36.8 (2025 16:23)  T(F): 98.24 (2025 16:23), Max: 98.24 (2025 16:23)  HR: 69 (2025 19:40) (64 - 80)  BP: 106/68 (2025 19:40) (106/68 - 124/64)  RR: 16 (2025 19:40) (16 - 18)  SpO2: 100% (10 Jul 2025 01:17) (100% - 100%)    Parameters below as of 2025 11:04  Patient On (Oxygen Delivery Method): room air    SVE: SROM clear @1255    Labs:                        11.4   7.78  )-----------( 136      ( 2025 11:40 )             33.9        ABO RH Interpretation: O POS (25 @ 11:27)    Urinalysis Basic - ( 2025 11:40 )    Color: Yellow / Appearance: Clear / S.019 / pH: x  Gluc: x / Ketone: x  / Bili: Negative / Urobili: 0.2 mg/dL   Blood: x / Protein: 30 mg/dL / Nitrite: Negative   Leuk Esterase: Moderate / RBC: 1 /HPF / WBC 25 /HPF   Sq Epi: x / Non Sq Epi: 10 /HPF / Bacteria: Few /HPF    Prenatal Syphilis Test: Nonreact (25 @ 11:40)

## 2025-07-10 NOTE — OB PROVIDER DELIVERY SUMMARY - NSSELHIDDEN_OBGYN_ALL_OB_FT
[NS_[email or web address]_ALL_OB_FT:[email or web address]=],[NS_DeliveryAssist2_OBGYN_ALL_OB_FT:[email or web address]=]
[NS_DeliveryAttending1_OBGYN_ALL_OB_FT:[email or web address]=],[NS_DeliveryAssist2_OBGYN_ALL_OB_FT:[email or web address]=],[NS_DeliveryRN_OBGYN_ALL_OB_FT:[id number]=],[NS_CirculateRN2_OBGYN_ALL_OB_FT:[id number]=]

## 2025-07-10 NOTE — OB PROVIDER DELIVERY SUMMARY - NSLOWPPHRISK_OBGYN_A_OB
No previous uterine incision/Landa Pregnancy/Less than or equal to 4 previous vaginal births/No known bleeding disorder/No history of postpartum hemorrhage/No other PPH risks indicated
No previous uterine incision/Landa Pregnancy/Less than or equal to 4 previous vaginal births/No known bleeding disorder/No history of postpartum hemorrhage/No other PPH risks indicated

## 2025-07-10 NOTE — PROCEDURE NOTE - ADDITIONAL PROCEDURE DETAILS
Epidural Note. Patient sitting. Lumbar epidural performed at L3-4. Pulse oximetry, NIBP, FHRM in place. Patient sitting. Sterile gloves, Chloro-prep. 1% lidocaine for local infiltration. 1 Attempt. LAURA to Air 5cm. 27g spinal needle inserted. + CSF. Denies paresthesia. Spinal needle removed. Flexitip Catheter threaded easily to 20cm. 17g Touhy needle removed. Epidural cathater pulled back and secured in place at 11 cm. Negative aspiration for CSF and blood. Test dose consisting of 3ml 2% lidocaine with epinephrine was negative. Remaining 2ml of test dose consisting of 2% lidocaine with epinephrine. Patient tolerated procedure and was hemodynamically stable throughout. 7 cc .0625% bupivacaine epidural.  T10 level bilaterally. Uncomplicated procedure. Covering attending physician aware. Vitals per nursing epidural protocol. Epidural Note. Patient sitting. Lumbar epidural performed at L3-4. Pulse oximetry, NIBP, FHRM in place. Patient sitting. Sterile gloves, Chloro-prep. 1% lidocaine for local infiltration. 1 Attempt. LAURA to Air 5cm. 27g spinal needle inserted. + CSF. Denies paresthesia. Spinal needle removed. Flexitip Catheter threaded easily to 20cm. 17g Touhy needle removed. Epidural cathater pulled back and secured in place at 11 cm. Negative aspiration for CSF and blood. Test dose consisting of 3ml 2% lidocaine with epinephrine was negative. Remaining 2ml of test dose consisting of 2% lidocaine with epinephrine. Patient tolerated procedure and was hemodynamically stable throughout. 7 cc .0625% bupivacaine epidural.  T10 level bilaterally. Uncomplicated procedure. Covering attending physician aware. Vitals per nursing epidural protocol.    Post Labor  Epidural/ Delivery  Evaluation Note:    Uncomplicated anesthetic for Vaginal Delivery.    Patient seen at bedside. Epidural to be removed by RN before patient transfer.  Patient moving B/L lower extremities.  Motor block appropriate and resolving. Vital Signs are stable. Pain well controlled.     Ct Score greater than 9    Mental Status:  __x__ Awake   ___x__ Alert   _____ Drowsy   _____ Sedated    Nausea/Vomiting:  __x__ NO  ______Yes,   See Post - Op Orders          Pain Scale (0-10):  _____    Treatment: __X__ None       Plan: Discharge:   ____Home       __X___Floor     _____Critical Care    _____

## 2025-07-10 NOTE — LACTATION INITIAL EVALUATION - LACTATION INTERVENTIONS
reviewed guide to postpartum and  care./initiate/review hand expression/initiate/review techniques for position and latch

## 2025-07-10 NOTE — OB PROVIDER DELIVERY SUMMARY - NSMATERNALFETALCONCERNS_OBGYN_ALL_OB_FT
Fetal Alert  6.20.25- Fetal echo 25 due to concern for irregular heart rhythm.Normal anatomy. Fetal heart rhythm during exam: sinus rhythm with occasional premature atrial contractions. Recommend  EKG.Venita Soto RN.    
Fetal Alert  6.20.25- Fetal echo 25 due to concern for irregular heart rhythm.Normal anatomy. Fetal heart rhythm during exam: sinus rhythm with occasional premature atrial contractions. Recommend  EKG.Venita Soto RN.

## 2025-07-10 NOTE — OB PROVIDER DELIVERY SUMMARY - NSPROVIDERDELIVERYNOTE_OBGYN_ALL_OB_FT
Patient was fully dilated and pushed. Fetal head delivered OA, and restituted to ROT. The anterior and  posterior shoulders delivered, followed by the remaining body atraumatically. The cord was clamped and cut. Cord blood & gases collected. The  was handed to mother for skin to skin. The placenta delivered spontaneously intact with 3v cord. Uterus massaged and firm with oxytocin given IV, patient stable. Cervix, vagina and perineum inspected.  live female delivered. QBL 260cc, hemostasis assured, uterus firm, patient in stable condition.
Patient was fully dilated and pushed. Fetal head delivered OA, and restituted to ROT. The anterior and  posterior shoulders delivered, followed by the remaining body atraumatically. The cord was clamped and cut. Cord blood & gases collected. The  was handed to the pediatric team. The placenta delivered spontaneously intact with 3v cord. Uterus massaged and firm with oxytocin given IV, patient stable. Cervix, vagina and perineum inspected. Repair of 1st degree laceration repaired in the usual fashion with 2-0 chromic.  live male delivered. , hemostasis assured, uterus firm, patient in stable condition.

## 2025-07-10 NOTE — OB PROVIDER LABOR PROGRESS NOTE - ASSESSMENT
A/P:  29y  at 39w3d, GBS neg, IOL, IUGR w/ normal dopplers.     -continue pitocin  -patient desires an epidural  -cont efm/toco  -cont to monitor vitals  -cont iv hydration   A/P:  29y  at 39w3d, GBS neg, IOL for IUGR w/ normal dopplers, s/p SROM @0055, on pitocin    -continue pitocin, currently at 4u  -patient desires an epidural  -cont efm/toco  -cont to monitor vitals  -cont iv hydration  -reevaluate

## 2025-07-11 ENCOUNTER — APPOINTMENT (OUTPATIENT)
Dept: ANTEPARTUM | Facility: CLINIC | Age: 30
End: 2025-07-11

## 2025-07-11 ENCOUNTER — TRANSCRIPTION ENCOUNTER (OUTPATIENT)
Age: 30
End: 2025-07-11

## 2025-07-11 VITALS
OXYGEN SATURATION: 96 % | RESPIRATION RATE: 18 BRPM | HEART RATE: 78 BPM | TEMPERATURE: 98 F | DIASTOLIC BLOOD PRESSURE: 73 MMHG | SYSTOLIC BLOOD PRESSURE: 109 MMHG

## 2025-07-11 PROCEDURE — 99238 HOSP IP/OBS DSCHRG MGMT 30/<: CPT

## 2025-07-11 RX ORDER — SIMETHICONE 80 MG
1 TABLET,CHEWABLE ORAL
Qty: 30 | Refills: 0
Start: 2025-07-11 | End: 2025-07-15

## 2025-07-11 RX ORDER — IBUPROFEN 200 MG
1 TABLET ORAL
Qty: 56 | Refills: 0
Start: 2025-07-11 | End: 2025-07-24

## 2025-07-11 RX ORDER — ACETAMINOPHEN 500 MG/5ML
3 LIQUID (ML) ORAL
Qty: 168 | Refills: 0
Start: 2025-07-11 | End: 2025-07-24

## 2025-07-11 RX ADMIN — Medication 975 MILLIGRAM(S): at 16:08

## 2025-07-11 RX ADMIN — Medication 975 MILLIGRAM(S): at 03:20

## 2025-07-11 RX ADMIN — Medication 600 MILLIGRAM(S): at 00:26

## 2025-07-11 RX ADMIN — Medication 1 TABLET(S): at 15:38

## 2025-07-11 RX ADMIN — Medication 975 MILLIGRAM(S): at 08:41

## 2025-07-11 RX ADMIN — Medication 600 MILLIGRAM(S): at 06:29

## 2025-07-11 RX ADMIN — Medication 975 MILLIGRAM(S): at 09:11

## 2025-07-11 RX ADMIN — Medication 600 MILLIGRAM(S): at 06:58

## 2025-07-11 RX ADMIN — Medication 975 MILLIGRAM(S): at 15:38

## 2025-07-11 RX ADMIN — Medication 975 MILLIGRAM(S): at 02:50

## 2025-07-11 NOTE — DISCHARGE NOTE OB - MEDICATION SUMMARY - MEDICATIONS TO STOP TAKING
I will STOP taking the medications listed below when I get home from the hospital:    cefpodoxime 100 mg oral tablet  -- 1 tab(s) by mouth 2 times a day    levoFLOXacin 750 mg oral tablet  -- 1 tab(s) by mouth once a day

## 2025-07-11 NOTE — DISCHARGE NOTE OB - MEDICATION SUMMARY - MEDICATIONS TO CHANGE
I will SWITCH the dose or number of times a day I take the medications listed below when I get home from the hospital:  None I will SWITCH the dose or number of times a day I take the medications listed below when I get home from the hospital:    ibuprofen 600 mg oral tablet  -- 1 tab(s) by mouth every 6 hours    acetaminophen 325 mg oral tablet  -- 3 tab(s) by mouth every 6 hours

## 2025-07-11 NOTE — PROGRESS NOTE ADULT - ATTENDING COMMENTS
PPD2 s/p  meeting post-op milestones with labs and VS stable for discharge.   Return precautions given.   DC documentation provided

## 2025-07-11 NOTE — DISCHARGE NOTE OB - MEDICATION SUMMARY - MEDICATIONS TO TAKE
I will START or STAY ON the medications listed below when I get home from the hospital:    ibuprofen 600 mg oral tablet  -- 1 tab(s) by mouth every 6 hours  -- Indication: For pain    acetaminophen 325 mg oral tablet  -- 3 tab(s) by mouth every 6 hours  -- Indication: For pain    simethicone 80 mg oral tablet, chewable  -- 1 tab(s) by mouth every 4 hours as needed for Gas  -- Indication: For gas

## 2025-07-11 NOTE — DISCHARGE NOTE OB - CARE PROVIDER_API CALL
Yuliana Ugarte  Obstetrics & Gynecology  [address] [place] [id number]  Phone: [phone]  Fax: [phone]  Follow Up Time:

## 2025-07-11 NOTE — DISCHARGE NOTE OB - PATIENT PORTAL LINK FT
You can access the FollowMyHealth Patient Portal offered by [facility] by registering at the following website: [email or web address]. By joining [employer]’s FollowMyHealth portal, you will also be able to view your health information using other applications (apps) compatible with our system.

## 2025-07-11 NOTE — DISCHARGE NOTE OB - FINANCIAL ASSISTANCE
[facility] provides services at a reduced cost to those who are determined to be eligible through [facility]’s financial assistance program. Information regarding [facility]’s financial assistance program can be found by going to [email or web address] or by calling [phone].

## 2025-07-11 NOTE — PROGRESS NOTE ADULT - SUBJECTIVE AND OBJECTIVE BOX
PGY1 Post Partum note    Subjective: Patient seen and examined at bedside.  Denies any complaints.  Ambulating, tolerating PO, voiding, + flatus.  Pain well controlled.  Breastfeeding. Declined contraception.      Physical exam:    Vital Signs Last 24 Hrs  T(C): 36.6 (10 Jul 2025 23:52), Max: 36.6 (10 Jul 2025 23:52)  T(F): 97.9 (10 Jul 2025 23:52), Max: 97.9 (10 Jul 2025 23:52)  HR: 61 (10 Jul 2025 23:52) (61 - 81)  BP: 100/63 (10 Jul 2025 23:52) (100/63 - 124/78)  BP(mean): --  RR: 16 (10 Jul 2025 23:52) (16 - 18)  SpO2: 99% (10 Jul 2025 23:52) (98% - 99%)    Parameters below as of 10 Jul 2025 23:52  Patient On (Oxygen Delivery Method): room air        Gen: NAD  Abdomen: nondistended, soft, nontender, firm uterine fundus at the level of the umbilicus  Pelvic: Minimal rubra  Ext: No calf tenderness, no LE swelling      Meds:   acetaminophen     Tablet ..   975 milliGRAM(s) Oral (07-11-25 @ 02:50)   975 milliGRAM(s) Oral (07-10-25 @ 21:03)   975 milliGRAM(s) Oral (07-10-25 @ 15:54)   975 milliGRAM(s) Oral (07-10-25 @ 09:04)    ibuprofen  Tablet.   600 milliGRAM(s) Oral (07-11-25 @ 06:29)   600 milliGRAM(s) Oral (07-10-25 @ 23:56)   600 milliGRAM(s) Oral (07-10-25 @ 17:59)   600 milliGRAM(s) Oral (07-10-25 @ 13:01)    prenatal multivitamin   1 Tablet(s) Oral (07-10-25 @ 13:02)    sodium chloride 0.9% lock flush   3 milliLiter(s) IV Push (07-10-25 @ 22:10)   3 milliLiter(s) IV Push (07-10-25 @ 18:15)        Diet: regular    LABS:                        11.2   10.97 )-----------( 134      ( 10 Jul 2025 11:15 )             32.9                         11.4   7.78  )-----------( 136      ( 09 Jul 2025 11:40 )             33.9                   Allergies    No Known Drug Allergies  shellfish (Anaphylaxis)    Intolerances           PGY1 Post Partum note    Subjective: Patient seen and examined at bedside.  Denies any complaints.  Ambulating, tolerating PO, voiding, + flatus.  Pain well controlled.  Breastfeeding. Declined contraception.      Physical exam:    Vital Signs Last 24 Hrs  T(C): 36.6 (10 Jul 2025 23:52), Max: 36.6 (10 Jul 2025 23:52)  T(F): 97.9 (10 Jul 2025 23:52), Max: 97.9 (10 Jul 2025 23:52)  HR: 61 (10 Jul 2025 23:52) (61 - 81)  BP: 100/63 (10 Jul 2025 23:52) (100/63 - 124/78)  BP(mean): --  RR: 16 (10 Jul 2025 23:52) (16 - 18)  SpO2: 99% (10 Jul 2025 23:52) (98% - 99%)    Parameters below as of 10 Jul 2025 23:52  Patient On (Oxygen Delivery Method): room air        Gen: NAD  Abdomen: nondistended, soft, nontender, firm uterine fundus at the level of the umbilicus  Pelvic: Minimal rubra  Ext: No calf tenderness, no LE swelling        prenatal multivitamin   1 Tablet(s) Oral (07-10-25 @ 13:02)    sodium chloride 0.9% lock flush   3 milliLiter(s) IV Push (07-10-25 @ 22:10)   3 milliLiter(s) IV Push (07-10-25 @ 18:15)        Diet: regular    LABS:                        11.2   10.97 )-----------( 134      ( 10 Jul 2025 11:15 )             32.9                         11.4   7.78  )-----------( 136      ( 09 Jul 2025 11:40 )             33.9                   Allergies    No Known Drug Allergies  shellfish (Anaphylaxis)    Intolerances           PGY1 Post Partum note    Subjective: Patient seen and examined at bedside.  Denies any complaints.  Ambulating, tolerating PO, voiding, + flatus.  Pain well controlled.  Breastfeeding. Declined contraception.      Physical exam:    Vital Signs Last 24 Hrs  T(C): 36.6 (10 Jul 2025 23:52), Max: 36.6 (10 Jul 2025 23:52)  T(F): 97.9 (10 Jul 2025 23:52), Max: 97.9 (10 Jul 2025 23:52)  HR: 61 (10 Jul 2025 23:52) (61 - 81)  BP: 100/63 (10 Jul 2025 23:52) (100/63 - 124/78)  RR: 16 (10 Jul 2025 23:52) (16 - 18)  SpO2: 99% (10 Jul 2025 23:52) (98% - 99%)    Parameters below as of 10 Jul 2025 23:52  Patient On (Oxygen Delivery Method): room air        Gen: NAD  Abdomen: nondistended, soft, nontender, firm uterine fundus at the level of the umbilicus  Pelvic: Minimal rubra  Ext: No calf tenderness, no LE swelling    prenatal multivitamin   1 Tablet(s) Oral (07-10-25 @ 13:02)    sodium chloride 0.9% lock flush   3 milliLiter(s) IV Push (07-10-25 @ 22:10)   3 milliLiter(s) IV Push (07-10-25 @ 18:15)        Diet: regular    LABS:                        11.2   10.97 )-----------( 134      ( 10 Jul 2025 11:15 )             32.9                         11.4   7.78  )-----------( 136      ( 09 Jul 2025 11:40 )             33.9                   Allergies    No Known Drug Allergies  shellfish (Anaphylaxis)    Intolerances

## 2025-07-11 NOTE — DISCHARGE NOTE OB - HOSPITAL COURSE
30 yo s/p  recovering well. Meeting all PP milestones. Clinically and hemodynamically stable. H/H at discharge: 11.2/32.9. Cleared for discharge. Breastfeeding. Declines postpartum contraception.

## 2025-07-11 NOTE — PROGRESS NOTE ADULT - ASSESSMENT
A/P:  29y yo now P2 s/p , recovering well    - meeting all postpartum milestones  -encourage ambulation  -regular diet  -encourage PO hydration  -pain management prn   - contraception: declined  - for discharge today  - return precautions given  - instructed to follow up at 6 weeks for postpartum visit     A/P:  28 yo now P2 s/p , recovering well    - meeting all postpartum milestones  -encourage ambulation  -regular diet  -encourage PO hydration  -pain management prn   - contraception: declined  - for discharge today  - return precautions given  - instructed to follow up at 6 weeks for postpartum visit

## 2025-07-17 ENCOUNTER — NON-APPOINTMENT (OUTPATIENT)
Age: 30
End: 2025-07-17

## 2025-07-17 DIAGNOSIS — O36.5930 MATERNAL CARE FOR OTHER KNOWN OR SUSPECTED POOR FETAL GROWTH, THIRD TRIMESTER, NOT APPLICABLE OR UNSPECIFIED: ICD-10-CM

## 2025-07-17 DIAGNOSIS — O26.893 OTHER SPECIFIED PREGNANCY RELATED CONDITIONS, THIRD TRIMESTER: ICD-10-CM

## 2025-07-17 DIAGNOSIS — Z14.8 GENETIC CARRIER OF OTHER DISEASE: ICD-10-CM

## 2025-07-17 DIAGNOSIS — Z86.39 PERSONAL HISTORY OF OTHER ENDOCRINE, NUTRITIONAL AND METABOLIC DISEASE: ICD-10-CM

## 2025-07-17 DIAGNOSIS — Z3A.39 39 WEEKS GESTATION OF PREGNANCY: ICD-10-CM

## 2025-07-17 DIAGNOSIS — Z28.09 IMMUNIZATION NOT CARRIED OUT BECAUSE OF OTHER CONTRAINDICATION: ICD-10-CM

## 2025-07-17 DIAGNOSIS — R14.3 FLATULENCE: ICD-10-CM

## 2025-07-18 ENCOUNTER — APPOINTMENT (OUTPATIENT)
Dept: ANTEPARTUM | Facility: CLINIC | Age: 30
End: 2025-07-18

## 2025-08-08 ENCOUNTER — NON-APPOINTMENT (OUTPATIENT)
Age: 30
End: 2025-08-08

## 2025-08-22 ENCOUNTER — OUTPATIENT (OUTPATIENT)
Dept: OUTPATIENT SERVICES | Facility: HOSPITAL | Age: 30
LOS: 1 days | End: 2025-08-22
Payer: MEDICAID

## 2025-08-22 ENCOUNTER — APPOINTMENT (OUTPATIENT)
Dept: OBGYN | Facility: CLINIC | Age: 30
End: 2025-08-22

## 2025-08-22 VITALS
DIASTOLIC BLOOD PRESSURE: 76 MMHG | HEART RATE: 76 BPM | SYSTOLIC BLOOD PRESSURE: 116 MMHG | HEIGHT: 59 IN | BODY MASS INDEX: 24.39 KG/M2 | WEIGHT: 121 LBS

## 2025-08-22 PROCEDURE — 99214 OFFICE O/P EST MOD 30 MIN: CPT

## 2025-09-11 ENCOUNTER — NON-APPOINTMENT (OUTPATIENT)
Age: 30
End: 2025-09-11

## 2025-09-17 ENCOUNTER — NON-APPOINTMENT (OUTPATIENT)
Age: 30
End: 2025-09-17

## 2025-09-18 ENCOUNTER — NON-APPOINTMENT (OUTPATIENT)
Age: 30
End: 2025-09-18